# Patient Record
Sex: MALE | Race: BLACK OR AFRICAN AMERICAN | NOT HISPANIC OR LATINO | Employment: OTHER | URBAN - METROPOLITAN AREA
[De-identification: names, ages, dates, MRNs, and addresses within clinical notes are randomized per-mention and may not be internally consistent; named-entity substitution may affect disease eponyms.]

---

## 2019-05-15 ENCOUNTER — HOSPITAL ENCOUNTER (OUTPATIENT)
Dept: RADIOLOGY | Facility: HOSPITAL | Age: 44
Discharge: HOME | End: 2019-05-15
Attending: NURSE PRACTITIONER
Payer: MEDICARE

## 2019-05-15 ENCOUNTER — HOSPITAL ENCOUNTER (OUTPATIENT)
Dept: CARDIOLOGY | Facility: CLINIC | Age: 44
Discharge: HOME | End: 2019-05-15
Payer: MEDICARE

## 2019-05-15 DIAGNOSIS — R06.00 DYSPNEA: Primary | ICD-10-CM

## 2019-05-15 DIAGNOSIS — M79.662 BILATERAL CALF PAIN: ICD-10-CM

## 2019-05-15 DIAGNOSIS — R60.9 EDEMA: ICD-10-CM

## 2019-05-15 DIAGNOSIS — R06.2 WHEEZING: ICD-10-CM

## 2019-05-15 DIAGNOSIS — M79.661 BILATERAL CALF PAIN: ICD-10-CM

## 2019-05-15 DIAGNOSIS — R60.9 EDEMA: Primary | ICD-10-CM

## 2019-05-15 DIAGNOSIS — R06.00 DYSPNEA: ICD-10-CM

## 2019-05-15 PROCEDURE — 93010 EKG 12-LEAD: ICD-10-PCS | Mod: S$PBB,,, | Performed by: INTERNAL MEDICINE

## 2019-05-15 PROCEDURE — 71046 X-RAY EXAM CHEST 2 VIEWS: CPT | Mod: TC,PO

## 2019-05-15 PROCEDURE — 71046 XR CHEST PA AND LATERAL: ICD-10-PCS | Mod: 26,,, | Performed by: RADIOLOGY

## 2019-05-15 PROCEDURE — 93970 EXTREMITY STUDY: CPT | Mod: 26,,, | Performed by: RADIOLOGY

## 2019-05-15 PROCEDURE — 93010 ELECTROCARDIOGRAM REPORT: CPT | Mod: S$PBB,,, | Performed by: INTERNAL MEDICINE

## 2019-05-15 PROCEDURE — 93970 US LOWER EXTREMITY VEINS BILATERAL: ICD-10-PCS | Mod: 26,,, | Performed by: RADIOLOGY

## 2019-05-15 PROCEDURE — 71046 X-RAY EXAM CHEST 2 VIEWS: CPT | Mod: 26,,, | Performed by: RADIOLOGY

## 2019-05-15 PROCEDURE — 93970 EXTREMITY STUDY: CPT | Mod: TC,PO

## 2019-05-15 PROCEDURE — 93005 ELECTROCARDIOGRAM TRACING: CPT | Mod: PBBFAC,PO | Performed by: INTERNAL MEDICINE

## 2019-05-20 ENCOUNTER — HOSPITAL ENCOUNTER (EMERGENCY)
Facility: HOSPITAL | Age: 44
Discharge: HOME | End: 2019-05-20
Attending: EMERGENCY MEDICINE
Payer: MEDICARE

## 2019-05-20 VITALS
OXYGEN SATURATION: 97 % | SYSTOLIC BLOOD PRESSURE: 132 MMHG | DIASTOLIC BLOOD PRESSURE: 75 MMHG | TEMPERATURE: 99 F | WEIGHT: 264 LBS | HEART RATE: 89 BPM | RESPIRATION RATE: 18 BRPM

## 2019-05-20 DIAGNOSIS — M79.89 LEG SWELLING: ICD-10-CM

## 2019-05-20 DIAGNOSIS — R79.89 ELEVATED D-DIMER: Primary | ICD-10-CM

## 2019-05-20 LAB
ALBUMIN SERPL BCP-MCNC: 4.1 G/DL (ref 3.5–5.2)
ALP SERPL-CCNC: 67 U/L (ref 55–135)
ALT SERPL W/O P-5'-P-CCNC: 29 U/L (ref 10–44)
ANION GAP SERPL CALC-SCNC: 11 MMOL/L (ref 8–16)
ANISOCYTOSIS BLD QL SMEAR: SLIGHT
AST SERPL-CCNC: 24 U/L (ref 10–40)
BASOPHILS # BLD AUTO: 0.01 K/UL (ref 0–0.2)
BASOPHILS NFR BLD: 0.2 % (ref 0–1.9)
BILIRUB SERPL-MCNC: 0.3 MG/DL (ref 0.1–1)
BILIRUB UR QL STRIP: NEGATIVE
BNP SERPL-MCNC: <10 PG/ML (ref 0–99)
BUN SERPL-MCNC: 22 MG/DL (ref 6–20)
CALCIUM SERPL-MCNC: 10.3 MG/DL (ref 8.7–10.5)
CHLORIDE SERPL-SCNC: 102 MMOL/L (ref 95–110)
CK SERPL-CCNC: 378 U/L (ref 20–200)
CLARITY UR REFRACT.AUTO: CLEAR
CO2 SERPL-SCNC: 24 MMOL/L (ref 23–29)
COLOR UR AUTO: YELLOW
CREAT SERPL-MCNC: 1.3 MG/DL (ref 0.5–1.4)
DIFFERENTIAL METHOD: ABNORMAL
EOSINOPHIL # BLD AUTO: 0.1 K/UL (ref 0–0.5)
EOSINOPHIL NFR BLD: 2.2 % (ref 0–8)
ERYTHROCYTE [DISTWIDTH] IN BLOOD BY AUTOMATED COUNT: 14.1 % (ref 11.5–14.5)
EST. GFR  (AFRICAN AMERICAN): >60 ML/MIN/1.73 M^2
EST. GFR  (NON AFRICAN AMERICAN): >60 ML/MIN/1.73 M^2
GLUCOSE SERPL-MCNC: 115 MG/DL (ref 70–110)
GLUCOSE UR QL STRIP: NEGATIVE
HCT VFR BLD AUTO: 34.8 % (ref 40–54)
HGB BLD-MCNC: 11 G/DL (ref 14–18)
HGB UR QL STRIP: NEGATIVE
KETONES UR QL STRIP: NEGATIVE
LEUKOCYTE ESTERASE UR QL STRIP: NEGATIVE
LYMPHOCYTES # BLD AUTO: 0.9 K/UL (ref 1–4.8)
LYMPHOCYTES NFR BLD: 22.2 % (ref 18–48)
MCH RBC QN AUTO: 23.5 PG (ref 27–31)
MCHC RBC AUTO-ENTMCNC: 31.6 G/DL (ref 32–36)
MCV RBC AUTO: 74 FL (ref 82–98)
MONOCYTES # BLD AUTO: 0.4 K/UL (ref 0.3–1)
MONOCYTES NFR BLD: 8.6 % (ref 4–15)
NEUTROPHILS # BLD AUTO: 2.7 K/UL (ref 1.8–7.7)
NEUTROPHILS NFR BLD: 66.8 % (ref 38–73)
NITRITE UR QL STRIP: NEGATIVE
OVALOCYTES BLD QL SMEAR: ABNORMAL
PH UR STRIP: 6 [PH] (ref 5–8)
PLATELET # BLD AUTO: 242 K/UL (ref 150–350)
PMV BLD AUTO: 11.1 FL (ref 9.2–12.9)
POLYCHROMASIA BLD QL SMEAR: ABNORMAL
POTASSIUM SERPL-SCNC: 4.5 MMOL/L (ref 3.5–5.1)
PROT SERPL-MCNC: 8.5 G/DL (ref 6–8.4)
PROT UR QL STRIP: NEGATIVE
RBC # BLD AUTO: 4.68 M/UL (ref 4.6–6.2)
SODIUM SERPL-SCNC: 137 MMOL/L (ref 136–145)
SP GR UR STRIP: 1.01 (ref 1–1.03)
TROPONIN I SERPL DL<=0.01 NG/ML-MCNC: 0.02 NG/ML (ref 0–0.03)
URN SPEC COLLECT METH UR: NORMAL
UROBILINOGEN UR STRIP-ACNC: NEGATIVE EU/DL
WBC # BLD AUTO: 4.09 K/UL (ref 3.9–12.7)

## 2019-05-20 PROCEDURE — 99900035 HC TECH TIME PER 15 MIN (STAT): Mod: ER

## 2019-05-20 PROCEDURE — 81003 URINALYSIS AUTO W/O SCOPE: CPT | Mod: ER

## 2019-05-20 PROCEDURE — 85025 COMPLETE CBC W/AUTO DIFF WBC: CPT | Mod: ER

## 2019-05-20 PROCEDURE — 84484 ASSAY OF TROPONIN QUANT: CPT | Mod: ER

## 2019-05-20 PROCEDURE — 93010 ELECTROCARDIOGRAM REPORT: CPT | Mod: ,,, | Performed by: NUCLEAR MEDICINE

## 2019-05-20 PROCEDURE — 99285 EMERGENCY DEPT VISIT HI MDM: CPT | Mod: ER

## 2019-05-20 PROCEDURE — 25500020 PHARM REV CODE 255: Mod: ER | Performed by: EMERGENCY MEDICINE

## 2019-05-20 PROCEDURE — 82550 ASSAY OF CK (CPK): CPT | Mod: ER

## 2019-05-20 PROCEDURE — 93010 EKG 12-LEAD: ICD-10-PCS | Mod: ,,, | Performed by: NUCLEAR MEDICINE

## 2019-05-20 PROCEDURE — 93005 ELECTROCARDIOGRAM TRACING: CPT | Mod: ER

## 2019-05-20 PROCEDURE — 83880 ASSAY OF NATRIURETIC PEPTIDE: CPT | Mod: ER

## 2019-05-20 PROCEDURE — 80053 COMPREHEN METABOLIC PANEL: CPT | Mod: ER

## 2019-05-20 RX ORDER — AMLODIPINE AND BENAZEPRIL HYDROCHLORIDE 10; 40 MG/1; MG/1
1 CAPSULE ORAL DAILY
Refills: 1 | COMMUNITY
Start: 2019-04-23

## 2019-05-20 RX ORDER — MELOXICAM 7.5 MG/1
TABLET ORAL
Refills: 0 | COMMUNITY
Start: 2019-04-22

## 2019-05-20 RX ORDER — FUROSEMIDE 40 MG/1
TABLET ORAL
Refills: 0 | COMMUNITY
Start: 2019-05-14

## 2019-05-20 RX ORDER — POTASSIUM CHLORIDE 750 MG/1
TABLET, EXTENDED RELEASE ORAL
Refills: 0 | COMMUNITY
Start: 2019-05-14

## 2019-05-20 RX ORDER — HYDROCHLOROTHIAZIDE 25 MG/1
25 TABLET ORAL DAILY
Refills: 1 | COMMUNITY
Start: 2019-03-04

## 2019-05-20 RX ADMIN — IOHEXOL 100 ML: 350 INJECTION, SOLUTION INTRAVENOUS at 11:05

## 2019-05-20 NOTE — ED PROVIDER NOTES
"Encounter Date: 5/20/2019       History     Chief Complaint   Patient presents with    Abnormal Lab     sent over for elevated d-dimer. pt has no complaints     Patient with a history of TBI presents after seeing his PCP and having an elevated D-Dimer.  Denies chest pain or sob.  Only complaint is leg swelling for which his PCP did an ultrasound which was negative and and give a prescription for a diuretic.    The history is provided by the patient.   Illness    The current episode started several days ago. The problem occurs rarely. The problem has been unchanged. Nothing relieves the symptoms. Nothing aggravates the symptoms. Pertinent negatives include no diarrhea, no nausea, no vomiting, no cough and no shortness of breath. Services received include tests performed and medications given. Recently, medical care has been given by the PCP.     Review of patient's allergies indicates:  No Known Allergies  Past Medical History:   Diagnosis Date    Hypertension     Traumatic brain injury      Past Surgical History:   Procedure Laterality Date    ABDOMINAL SURGERY       History reviewed. No pertinent family history.  Social History     Tobacco Use    Smoking status: Never Smoker    Smokeless tobacco: Never Used   Substance Use Topics    Alcohol use: Yes     Comment: "2 or 3 beers" daily     Drug use: Yes     Types: Marijuana     Comment: occassionally      Review of Systems   Respiratory: Negative for cough and shortness of breath.    Gastrointestinal: Negative for diarrhea, nausea and vomiting.       Physical Exam     Initial Vitals [05/20/19 0943]   BP Pulse Resp Temp SpO2   137/62 99 18 98.1 °F (36.7 °C) 97 %      MAP       --         Physical Exam    Nursing note and vitals reviewed.  Constitutional: He appears well-developed and well-nourished. No distress.   HENT:   Head: Normocephalic and atraumatic.   Mouth/Throat: Oropharynx is clear and moist.   Eyes: Conjunctivae and EOM are normal. Pupils are equal, " round, and reactive to light.   Neck: Normal range of motion. Neck supple.   Cardiovascular: Normal rate, regular rhythm and normal heart sounds. Exam reveals no gallop and no friction rub.    No murmur heard.  Pulmonary/Chest: Breath sounds normal. No respiratory distress. He has no wheezes. He has no rhonchi. He has no rales.   Abdominal: Soft. Bowel sounds are normal. He exhibits no distension and no mass. There is no tenderness. There is no rebound and no guarding.   Musculoskeletal: Normal range of motion. He exhibits no edema.   Neurological: He is alert and oriented to person, place, and time. He has normal strength.   Skin: Skin is warm and dry. No rash noted.   Psychiatric: He has a normal mood and affect. Thought content normal.         ED Course   Procedures  Labs Reviewed   CBC W/ AUTO DIFFERENTIAL - Abnormal; Notable for the following components:       Result Value    Hemoglobin 11.0 (*)     Hematocrit 34.8 (*)     Mean Corpuscular Volume 74 (*)     Mean Corpuscular Hemoglobin 23.5 (*)     Mean Corpuscular Hemoglobin Conc 31.6 (*)     Lymph # 0.9 (*)     All other components within normal limits   COMPREHENSIVE METABOLIC PANEL - Abnormal; Notable for the following components:    Glucose 115 (*)     BUN, Bld 22 (*)     Total Protein 8.5 (*)     All other components within normal limits   CK - Abnormal; Notable for the following components:     (*)     All other components within normal limits   URINALYSIS, REFLEX TO URINE CULTURE    Narrative:     Preferred Collection Type->Urine, Clean Catch   B-TYPE NATRIURETIC PEPTIDE   TROPONIN I     EKG Readings: (Independently Interpreted)   Rhythm: Normal Sinus Rhythm. Heart Rate: 92. Ectopy: No Ectopy. Conduction: Normal. ST Segments: Normal ST Segments. T Waves: Normal. Clinical Impression: Normal Sinus Rhythm       Imaging Results          CTA Chest Non-Coronary (Final result)  Result time 05/20/19 11:30:22    Final result by Anjum Wilcox MD (05/20/19  11:30:22)                 Impression:      No evidence of pulmonary embolism.  No acute aortic disease.  Lungs are clear.  Airways patent.      Electronically signed by: Anjum Brenden  Date:    05/20/2019  Time:    11:30             Narrative:    EXAMINATION:  CTA CHEST NON CORONARY    CLINICAL HISTORY:  Chest pain, acute, PE suspected, intermed prob, positive D-dimer;Shortness of breath (786.05);    TECHNIQUE:  Low dose axial images, sagittal and coronal reformations were obtained from the thoracic inlet to the lung bases following the IV administration of 100 mL of Omnipaque 350.  All CT scans at this location are performed using dose modulation techniques as appropriate to a performed exam including the following: Automated exposure control; adjustment of the mA and/or kV  according to patient size.    COMPARISON:  Chest radiograph 05/20/2019    FINDINGS:  Base of Neck: No significant abnormality.    Thoracic soft tissues: Unremarkable.    Aorta: Left-sided aortic arch.  No aneurysm and no significant atherosclerosis    Heart: Normal size. No effusion.    Pulmonary vasculature: Pulmonary arteries distribute normally.  There are four pulmonary veins.    [place]/Mediastinum: No pathologic zackery enlargement.    Airways: Patent.  Mild cylindrical bronchiectasis.    Lungs/Pleura: Clear lungs. No pleural effusion or thickening.    Esophagus: Unremarkable.    Upper Abdomen: No acute abnormality of the partially imaged upper abdomen.  Suggestion of gastric suture material noted.    Bones: No acute fracture. No suspicious lytic or sclerotic lesions.                               X-Ray Chest AP Portable (Final result)  Result time 05/20/19 10:47:11    Final result by ANNE Jo Sr., MD (05/20/19 10:47:11)                 Impression:      1. The lungs are clear.  2. There is a mild amount of levoconvex curvature of the thoracolumbar spine.  .      Electronically signed by: Sky Jo  MD  Date:    05/20/2019  Time:    10:47             Narrative:    EXAMINATION:  XR CHEST AP PORTABLE    CLINICAL HISTORY:  leg swelling;    COMPARISON:  05/15/2019    FINDINGS:  The size of the heart is normal. The lungs are clear. There is no pneumothorax.  The costophrenic angles are sharp.  There is a mild amount of levoconvex curvature of the thoracolumbar spine.                                      11:36 AM: Reassessed pt at this time.  Pt states his condition has improved at this time. Discussed with pt all pertinent ED information and results. Discussed pt dx of leg swelling and plan of tx. Gave pt all f/u and return to the ED instructions. All questions and concerns were addressed at this time. Pt expresses understanding of information and instructions, and is comfortable with plan to discharge. Pt is stable for discharge.                     Clinical Impression:       ICD-10-CM ICD-9-CM   1. Elevated d-dimer R79.89 790.92   2. Leg swelling M79.89 729.81           Disposition:   Disposition: Discharged  Condition: Stable                        Carroll Peguero MD  05/20/19 1136       Carroll Peguero MD  05/29/19 1548

## 2019-05-20 NOTE — ED NOTES
Dr. Peguero is at the bedside updating pt on test results and poc. Pt has verbalized understanding, and he denies having any further questions or concerns at this time.

## 2021-06-26 ENCOUNTER — HOSPITAL ENCOUNTER (EMERGENCY)
Facility: HOSPITAL | Age: 46
Discharge: HOME | End: 2021-06-26
Attending: EMERGENCY MEDICINE
Payer: MEDICARE

## 2021-06-26 VITALS
BODY MASS INDEX: 50.15 KG/M2 | TEMPERATURE: 98 F | OXYGEN SATURATION: 96 % | HEART RATE: 98 BPM | RESPIRATION RATE: 20 BRPM | HEIGHT: 65 IN | WEIGHT: 301 LBS | DIASTOLIC BLOOD PRESSURE: 88 MMHG | SYSTOLIC BLOOD PRESSURE: 126 MMHG

## 2021-06-26 DIAGNOSIS — I89.0 LYMPHEDEMA OF LEFT LOWER EXTREMITY: Primary | ICD-10-CM

## 2021-06-26 DIAGNOSIS — R60.0 PERIPHERAL EDEMA: ICD-10-CM

## 2021-06-26 DIAGNOSIS — R60.0 BILATERAL LOWER EXTREMITY EDEMA: ICD-10-CM

## 2021-06-26 DIAGNOSIS — R50.9 FEVER: ICD-10-CM

## 2021-06-26 LAB
ALBUMIN SERPL BCP-MCNC: 3.8 G/DL (ref 3.5–5.2)
ALP SERPL-CCNC: 54 U/L (ref 55–135)
ALT SERPL W/O P-5'-P-CCNC: 27 U/L (ref 10–44)
ANION GAP SERPL CALC-SCNC: 13 MMOL/L (ref 8–16)
AST SERPL-CCNC: 19 U/L (ref 10–40)
BASOPHILS # BLD AUTO: 0.01 K/UL (ref 0–0.2)
BASOPHILS NFR BLD: 0.2 % (ref 0–1.9)
BILIRUB SERPL-MCNC: 0.5 MG/DL (ref 0.1–1)
BILIRUB UR QL STRIP: NEGATIVE
BUN SERPL-MCNC: 11 MG/DL (ref 6–20)
CALCIUM SERPL-MCNC: 9.4 MG/DL (ref 8.7–10.5)
CHLORIDE SERPL-SCNC: 103 MMOL/L (ref 95–110)
CLARITY UR REFRACT.AUTO: CLEAR
CO2 SERPL-SCNC: 24 MMOL/L (ref 23–29)
COLOR UR AUTO: YELLOW
CREAT SERPL-MCNC: 1.1 MG/DL (ref 0.5–1.4)
CTP QC/QA: YES
DIFFERENTIAL METHOD: ABNORMAL
EOSINOPHIL # BLD AUTO: 0.3 K/UL (ref 0–0.5)
EOSINOPHIL NFR BLD: 4.5 % (ref 0–8)
ERYTHROCYTE [DISTWIDTH] IN BLOOD BY AUTOMATED COUNT: 14.6 % (ref 11.5–14.5)
EST. GFR  (AFRICAN AMERICAN): >60 ML/MIN/1.73 M^2
EST. GFR  (NON AFRICAN AMERICAN): >60 ML/MIN/1.73 M^2
GLUCOSE SERPL-MCNC: 98 MG/DL (ref 70–110)
GLUCOSE UR QL STRIP: NEGATIVE
HCT VFR BLD AUTO: 32.3 % (ref 40–54)
HGB BLD-MCNC: 10.2 G/DL (ref 14–18)
HGB UR QL STRIP: NEGATIVE
IMM GRANULOCYTES # BLD AUTO: 0.02 K/UL (ref 0–0.04)
IMM GRANULOCYTES NFR BLD AUTO: 0.3 % (ref 0–0.5)
KETONES UR QL STRIP: NEGATIVE
LACTATE SERPL-SCNC: 1.1 MMOL/L (ref 0.5–2.2)
LEUKOCYTE ESTERASE UR QL STRIP: NEGATIVE
LYMPHOCYTES # BLD AUTO: 1 K/UL (ref 1–4.8)
LYMPHOCYTES NFR BLD: 15.1 % (ref 18–48)
MCH RBC QN AUTO: 23.6 PG (ref 27–31)
MCHC RBC AUTO-ENTMCNC: 31.6 G/DL (ref 32–36)
MCV RBC AUTO: 75 FL (ref 82–98)
MONOCYTES # BLD AUTO: 0.6 K/UL (ref 0.3–1)
MONOCYTES NFR BLD: 8.6 % (ref 4–15)
NEUTROPHILS # BLD AUTO: 4.7 K/UL (ref 1.8–7.7)
NEUTROPHILS NFR BLD: 71.3 % (ref 38–73)
NITRITE UR QL STRIP: NEGATIVE
NRBC BLD-RTO: 0 /100 WBC
PH UR STRIP: 6 [PH] (ref 5–8)
PLATELET # BLD AUTO: 237 K/UL (ref 150–450)
PMV BLD AUTO: 10.1 FL (ref 9.2–12.9)
POTASSIUM SERPL-SCNC: 3.9 MMOL/L (ref 3.5–5.1)
PROT SERPL-MCNC: 8.4 G/DL (ref 6–8.4)
PROT UR QL STRIP: NEGATIVE
RBC # BLD AUTO: 4.32 M/UL (ref 4.6–6.2)
SARS-COV-2 RDRP RESP QL NAA+PROBE: NEGATIVE
SODIUM SERPL-SCNC: 140 MMOL/L (ref 136–145)
SP GR UR STRIP: 1.02 (ref 1–1.03)
URN SPEC COLLECT METH UR: NORMAL
UROBILINOGEN UR STRIP-ACNC: NEGATIVE EU/DL
WBC # BLD AUTO: 6.62 K/UL (ref 3.9–12.7)

## 2021-06-26 PROCEDURE — 83605 ASSAY OF LACTIC ACID: CPT | Mod: ER | Performed by: EMERGENCY MEDICINE

## 2021-06-26 PROCEDURE — 80053 COMPREHEN METABOLIC PANEL: CPT | Mod: ER | Performed by: EMERGENCY MEDICINE

## 2021-06-26 PROCEDURE — 99284 EMERGENCY DEPT VISIT MOD MDM: CPT | Mod: 25,ER

## 2021-06-26 PROCEDURE — 81003 URINALYSIS AUTO W/O SCOPE: CPT | Mod: ER | Performed by: EMERGENCY MEDICINE

## 2021-06-26 PROCEDURE — 85025 COMPLETE CBC W/AUTO DIFF WBC: CPT | Mod: ER | Performed by: EMERGENCY MEDICINE

## 2021-06-26 PROCEDURE — U0002 COVID-19 LAB TEST NON-CDC: HCPCS | Mod: ER | Performed by: EMERGENCY MEDICINE

## 2023-01-23 ENCOUNTER — HOSPITAL ENCOUNTER (EMERGENCY)
Facility: HOSPITAL | Age: 48
Discharge: HOME | End: 2023-01-23
Attending: EMERGENCY MEDICINE
Payer: COMMERCIAL

## 2023-01-23 VITALS
DIASTOLIC BLOOD PRESSURE: 71 MMHG | HEART RATE: 97 BPM | SYSTOLIC BLOOD PRESSURE: 146 MMHG | OXYGEN SATURATION: 97 % | TEMPERATURE: 99 F | BODY MASS INDEX: 52.74 KG/M2 | WEIGHT: 315 LBS | RESPIRATION RATE: 20 BRPM

## 2023-01-23 DIAGNOSIS — I89.0 LYMPHEDEMA: Primary | ICD-10-CM

## 2023-01-23 DIAGNOSIS — T14.8XXA WOUND INFECTION: ICD-10-CM

## 2023-01-23 DIAGNOSIS — L08.9 WOUND INFECTION: ICD-10-CM

## 2023-01-23 LAB
ALBUMIN SERPL BCP-MCNC: 4.1 G/DL (ref 3.5–5.2)
ALP SERPL-CCNC: 67 U/L (ref 55–135)
ALT SERPL W/O P-5'-P-CCNC: 27 U/L (ref 10–44)
ANION GAP SERPL CALC-SCNC: 11 MMOL/L (ref 8–16)
AST SERPL-CCNC: 17 U/L (ref 10–40)
BASOPHILS # BLD AUTO: 0.02 K/UL (ref 0–0.2)
BASOPHILS NFR BLD: 0.4 % (ref 0–1.9)
BILIRUB SERPL-MCNC: 0.4 MG/DL (ref 0.1–1)
BUN SERPL-MCNC: 16 MG/DL (ref 6–20)
CALCIUM SERPL-MCNC: 9.7 MG/DL (ref 8.7–10.5)
CHLORIDE SERPL-SCNC: 100 MMOL/L (ref 95–110)
CO2 SERPL-SCNC: 27 MMOL/L (ref 23–29)
CREAT SERPL-MCNC: 1.2 MG/DL (ref 0.5–1.4)
DIFFERENTIAL METHOD: ABNORMAL
EOSINOPHIL # BLD AUTO: 0.1 K/UL (ref 0–0.5)
EOSINOPHIL NFR BLD: 2 % (ref 0–8)
ERYTHROCYTE [DISTWIDTH] IN BLOOD BY AUTOMATED COUNT: 13.8 % (ref 11.5–14.5)
EST. GFR  (NO RACE VARIABLE): >60 ML/MIN/1.73 M^2
GLUCOSE SERPL-MCNC: 122 MG/DL (ref 70–110)
HCT VFR BLD AUTO: 39 % (ref 40–54)
HCV AB SERPL QL IA: NEGATIVE
HEP C VIRUS HOLD SPECIMEN: NORMAL
HGB BLD-MCNC: 12 G/DL (ref 14–18)
HIV 1+2 AB+HIV1 P24 AG SERPL QL IA: NEGATIVE
IMM GRANULOCYTES # BLD AUTO: 0.02 K/UL (ref 0–0.04)
IMM GRANULOCYTES NFR BLD AUTO: 0.4 % (ref 0–0.5)
LACTATE SERPL-SCNC: 1.7 MMOL/L (ref 0.5–2.2)
LYMPHOCYTES # BLD AUTO: 1 K/UL (ref 1–4.8)
LYMPHOCYTES NFR BLD: 17.4 % (ref 18–48)
MCH RBC QN AUTO: 23.3 PG (ref 27–31)
MCHC RBC AUTO-ENTMCNC: 30.8 G/DL (ref 32–36)
MCV RBC AUTO: 76 FL (ref 82–98)
MONOCYTES # BLD AUTO: 0.5 K/UL (ref 0.3–1)
MONOCYTES NFR BLD: 8.3 % (ref 4–15)
NEUTROPHILS # BLD AUTO: 3.9 K/UL (ref 1.8–7.7)
NEUTROPHILS NFR BLD: 71.5 % (ref 38–73)
NRBC BLD-RTO: 0 /100 WBC
PLATELET # BLD AUTO: 257 K/UL (ref 150–450)
PMV BLD AUTO: 11.1 FL (ref 9.2–12.9)
POTASSIUM SERPL-SCNC: 4 MMOL/L (ref 3.5–5.1)
PROT SERPL-MCNC: 8.5 G/DL (ref 6–8.4)
RBC # BLD AUTO: 5.14 M/UL (ref 4.6–6.2)
SODIUM SERPL-SCNC: 138 MMOL/L (ref 136–145)
WBC # BLD AUTO: 5.51 K/UL (ref 3.9–12.7)

## 2023-01-23 PROCEDURE — 85025 COMPLETE CBC W/AUTO DIFF WBC: CPT | Mod: ER | Performed by: EMERGENCY MEDICINE

## 2023-01-23 PROCEDURE — 87040 BLOOD CULTURE FOR BACTERIA: CPT | Mod: 59 | Performed by: EMERGENCY MEDICINE

## 2023-01-23 PROCEDURE — 99284 EMERGENCY DEPT VISIT MOD MDM: CPT | Mod: 25,ER

## 2023-01-23 PROCEDURE — 86803 HEPATITIS C AB TEST: CPT | Performed by: EMERGENCY MEDICINE

## 2023-01-23 PROCEDURE — 80053 COMPREHEN METABOLIC PANEL: CPT | Mod: ER | Performed by: EMERGENCY MEDICINE

## 2023-01-23 PROCEDURE — 83605 ASSAY OF LACTIC ACID: CPT | Mod: ER | Performed by: EMERGENCY MEDICINE

## 2023-01-23 PROCEDURE — 25000003 PHARM REV CODE 250: Mod: ER | Performed by: EMERGENCY MEDICINE

## 2023-01-23 PROCEDURE — 87389 HIV-1 AG W/HIV-1&-2 AB AG IA: CPT | Performed by: EMERGENCY MEDICINE

## 2023-01-23 RX ORDER — CEPHALEXIN 500 MG/1
500 CAPSULE ORAL 4 TIMES DAILY
Qty: 28 CAPSULE | Refills: 0 | Status: SHIPPED | OUTPATIENT
Start: 2023-01-23 | End: 2023-01-30

## 2023-01-23 RX ORDER — SULFAMETHOXAZOLE AND TRIMETHOPRIM 800; 160 MG/1; MG/1
1 TABLET ORAL 2 TIMES DAILY
Qty: 14 TABLET | Refills: 0 | Status: SHIPPED | OUTPATIENT
Start: 2023-01-23 | End: 2023-01-30

## 2023-01-23 RX ORDER — BUMETANIDE 1 MG/1
1 TABLET ORAL
COMMUNITY
Start: 2023-01-09

## 2023-01-23 RX ORDER — SULFAMETHOXAZOLE AND TRIMETHOPRIM 800; 160 MG/1; MG/1
1 TABLET ORAL
Status: COMPLETED | OUTPATIENT
Start: 2023-01-23 | End: 2023-01-23

## 2023-01-23 RX ORDER — CEPHALEXIN 500 MG/1
500 CAPSULE ORAL
Status: COMPLETED | OUTPATIENT
Start: 2023-01-23 | End: 2023-01-23

## 2023-01-23 RX ADMIN — SULFAMETHOXAZOLE AND TRIMETHOPRIM 1 TABLET: 800; 160 TABLET ORAL at 03:01

## 2023-01-23 RX ADMIN — CEPHALEXIN 500 MG: 500 CAPSULE ORAL at 03:01

## 2023-01-23 NOTE — ED PROVIDER NOTES
"Encounter Date: 1/23/2023       History     Chief Complaint   Patient presents with    Skin Ulcer     Sent from Dr Zimmerman's office for LLE ulcer     Patient is a 47-year-old male with a past medical history of lymphedema who presents to the emergency department at the direction of his primary care physician's office for evaluation of a left lower extremity wound.  Patient has a diagnosis of a chronic left lower extremity wound, but patient reports some recent foul smelling and some enlargement in his left groin region.  He denies any fever/chills.  States he was being treated by home health and wound care, but they have stopped coming.  He reports that he needs another referral.  Denies chest pain, shortness of breath and all other complaints.    Review of patient's allergies indicates:   Allergen Reactions    Dilantin infatabs [phenytoin]      Past Medical History:   Diagnosis Date    Hypertension     Traumatic brain injury      Past Surgical History:   Procedure Laterality Date    ABDOMINAL SURGERY       History reviewed. No pertinent family history.  Social History     Tobacco Use    Smoking status: Never    Smokeless tobacco: Never   Substance Use Topics    Alcohol use: Yes     Comment: "2 or 3 beers" daily     Drug use: Yes     Types: Marijuana     Comment: occassionally      Review of Systems   All other systems reviewed and are negative.  See HPI.  Otherwise negative    Physical Exam     Initial Vitals   BP Pulse Resp Temp SpO2   01/23/23 1113 01/23/23 1114 01/23/23 1110 01/23/23 1110 01/23/23 1110   (!) 153/68 110 20 98.7 °F (37.1 °C) 98 %      MAP       --                Physical Exam    Nursing note and vitals reviewed.  Constitutional: No distress.   Elevated BMI   HENT:   Head: Normocephalic and atraumatic.   Mouth/Throat: Oropharynx is clear and moist.   Eyes: Conjunctivae and EOM are normal. Pupils are equal, round, and reactive to light.   Neck: Neck supple. No tracheal deviation present. "   Cardiovascular:  Normal rate, regular rhythm, normal heart sounds and intact distal pulses.           1+ DP pulse to left lower extremity.  Likely difficult to palpate due to the excessive edema.  Easily located with Doppler   Pulmonary/Chest: Breath sounds normal. No respiratory distress.   Abdominal: Abdomen is soft. He exhibits no distension. There is no abdominal tenderness. There is no rebound and no guarding.   Musculoskeletal:         General: Edema (Lymphedema to the bilateral lower extremities) present. No tenderness. Normal range of motion.      Cervical back: Neck supple.      Comments: Left inguinal lymphadenopathy     Neurological: He is alert and oriented to person, place, and time. GCS score is 15. GCS eye subscore is 4. GCS verbal subscore is 5. GCS motor subscore is 6.   No focal deficits   Skin: No erythema.   Foul smelling Quarter-sized wound to the left lower extremity.  No surrounding erythema.  No purulence.   Psychiatric: He has a normal mood and affect. His behavior is normal.       ED Course   Procedures  Labs Reviewed   CBC W/ AUTO DIFFERENTIAL - Abnormal; Notable for the following components:       Result Value    Hemoglobin 12.0 (*)     Hematocrit 39.0 (*)     MCV 76 (*)     MCH 23.3 (*)     MCHC 30.8 (*)     Lymph % 17.4 (*)     All other components within normal limits   COMPREHENSIVE METABOLIC PANEL - Abnormal; Notable for the following components:    Glucose 122 (*)     Total Protein 8.5 (*)     All other components within normal limits   CULTURE, BLOOD   CULTURE, BLOOD   LACTIC ACID, PLASMA   HIV 1 / 2 ANTIBODY   HEPATITIS C ANTIBODY   HEP C VIRUS HOLD SPECIMEN     Results for orders placed or performed during the hospital encounter of 01/23/23   CBC auto differential   Result Value Ref Range    WBC 5.51 3.90 - 12.70 K/uL    RBC 5.14 4.60 - 6.20 M/uL    Hemoglobin 12.0 (L) 14.0 - 18.0 g/dL    Hematocrit 39.0 (L) 40.0 - 54.0 %    MCV 76 (L) 82 - 98 fL    MCH 23.3 (L) 27.0 - 31.0 pg     MCHC 30.8 (L) 32.0 - 36.0 g/dL    RDW 13.8 11.5 - 14.5 %    Platelets 257 150 - 450 K/uL    MPV 11.1 9.2 - 12.9 fL    Immature Granulocytes 0.4 0.0 - 0.5 %    Gran # (ANC) 3.9 1.8 - 7.7 K/uL    Immature Grans (Abs) 0.02 0.00 - 0.04 K/uL    Lymph # 1.0 1.0 - 4.8 K/uL    Mono # 0.5 0.3 - 1.0 K/uL    Eos # 0.1 0.0 - 0.5 K/uL    Baso # 0.02 0.00 - 0.20 K/uL    nRBC 0 0 /100 WBC    Gran % 71.5 38.0 - 73.0 %    Lymph % 17.4 (L) 18.0 - 48.0 %    Mono % 8.3 4.0 - 15.0 %    Eosinophil % 2.0 0.0 - 8.0 %    Basophil % 0.4 0.0 - 1.9 %    Differential Method Automated    Comprehensive metabolic panel   Result Value Ref Range    Sodium 138 136 - 145 mmol/L    Potassium 4.0 3.5 - 5.1 mmol/L    Chloride 100 95 - 110 mmol/L    CO2 27 23 - 29 mmol/L    Glucose 122 (H) 70 - 110 mg/dL    BUN 16 6 - 20 mg/dL    Creatinine 1.2 0.5 - 1.4 mg/dL    Calcium 9.7 8.7 - 10.5 mg/dL    Total Protein 8.5 (H) 6.0 - 8.4 g/dL    Albumin 4.1 3.5 - 5.2 g/dL    Total Bilirubin 0.4 0.1 - 1.0 mg/dL    Alkaline Phosphatase 67 55 - 135 U/L    AST 17 10 - 40 U/L    ALT 27 10 - 44 U/L    Anion Gap 11 8 - 16 mmol/L    eGFR >60.0 >60 mL/min/1.73 m^2   Lactic acid, plasma   Result Value Ref Range    Lactate (Lactic Acid) 1.7 0.5 - 2.2 mmol/L            Imaging Results              US Soft Tissue, Groin Left (Final result)  Result time 01/23/23 12:53:08      Final result by ANNE Jo Sr., MD (01/23/23 12:53:08)                   Impression:      There are several enlarged lymph nodes in the left groin consistent with the patient's palpable abnormality.  If additional evaluation is clinically indicated, I recommend consideration of a biopsy/fine needle aspiration.      Electronically signed by: Sky Jo MD  Date:    01/23/2023  Time:    12:53               Narrative:    EXAMINATION:  US SOFT TISSUE, GROIN LEFT    CLINICAL HISTORY:  left groin swelling;    TECHNIQUE:  Multiple static ultrasound images are submitted for  interpretation.    COMPARISON:  None    FINDINGS:  There are several enlarged lymph nodes in the left groin consistent with the patient's palpable abnormality.  The larger 1 measures 7.1 cm x 2.1 cm by 4.6 cm.  The other lymph node measures 3.9 cm x 1.8 cm x 3.0 cm.                                       Medications   sulfamethoxazole-trimethoprim 800-160mg per tablet 1 tablet (has no administration in time range)   cephALEXin capsule 500 mg (has no administration in time range)     Medical Decision Making:   History:   Old Records Summarized: records from clinic visits.       <> Summary of Records: Notes reviewed from primary care physician office today.  Patient was sent to the emergency department to be evaluated for antibiotics, physical therapy, wound care, and vascular surgery according to the note  Initial Assessment:   Lymphedema with infected leg wound  Clinical Tests:   Lab Tests: Ordered and Reviewed  The following lab test(s) were unremarkable: CBC, CMP and Lactate       <> Summary of Lab: No leukocytosis  Radiological Study: Ordered and Reviewed  ED Management:  Chronic lymphedema with chronic left lower extremity wound with an acute infection of that wound.  Foul smelling with inguinal lymphadenopathy.  Will treat with antibiotics.  No indication at this time for hospitalization or requirements for IV antibiotics.  Will give a trial of p.o. antibiotics.  Discussed with patient's home health on the phone.  Home health to contact patient's primary care physician office today to arrange home health/wound care at home.  Patient agreeable to outpatient treatment.  ER return precautions provided                        Clinical Impression:   Final diagnoses:  [I89.0] Lymphedema (Primary)  [T14.8XXA, L08.9] Wound infection        ED Disposition Condition    Discharge Stable          ED Prescriptions       Medication Sig Dispense Start Date End Date Auth. Provider    sulfamethoxazole-trimethoprim 800-160mg  (BACTRIM DS) 800-160 mg Tab Take 1 tablet by mouth 2 (two) times daily. for 7 days 14 tablet 1/23/2023 1/30/2023 Kyaw Butts MD    cephALEXin (KEFLEX) 500 MG capsule Take 1 capsule (500 mg total) by mouth 4 (four) times daily. for 7 days 28 capsule 1/23/2023 1/30/2023 Kyaw Butts MD          Follow-up Information       Follow up With Specialties Details Why Contact Info    Follow-up with primary care physician and wound care/home health        [facility] - Emergency Dept Emergency Medicine  As needed, If symptoms worsen [phone]  [facility] [phone] [phone]             Kyaw Butts MD  01/23/23 9164

## 2023-01-29 LAB
BACTERIA BLD CULT: NORMAL
BACTERIA BLD CULT: NORMAL

## 2023-04-25 ENCOUNTER — TELEPHONE (OUTPATIENT)
Dept: CARDIOLOGY | Facility: CLINIC | Age: 48
End: 2023-04-25
Payer: COMMERCIAL

## 2024-08-23 ENCOUNTER — LAB VISIT (OUTPATIENT)
Dept: LAB | Facility: HOSPITAL | Age: 49
End: 2024-08-23
Payer: COMMERCIAL

## 2024-08-23 DIAGNOSIS — I10 ESSENTIAL HYPERTENSION, MALIGNANT: Primary | ICD-10-CM

## 2024-08-23 DIAGNOSIS — E11.9 DIABETES MELLITUS WITHOUT COMPLICATION: ICD-10-CM

## 2024-08-23 DIAGNOSIS — E66.01 MORBID OBESITY: ICD-10-CM

## 2024-08-23 LAB
ERYTHROCYTE [DISTWIDTH] IN BLOOD BY AUTOMATED COUNT: 14.7 % (ref 11.5–14.5)
HCT VFR BLD AUTO: 38.1 % (ref 40–54)
HGB BLD-MCNC: 11.7 G/DL (ref 14–18)
MCH RBC QN AUTO: 22.9 PG (ref 27–31)
MCHC RBC AUTO-ENTMCNC: 30.7 G/DL (ref 32–36)
MCV RBC AUTO: 74 FL (ref 82–98)
PLATELET # BLD AUTO: 225 K/UL (ref 150–450)
PMV BLD AUTO: 11.4 FL (ref 9.2–12.9)
RBC # BLD AUTO: 5.12 M/UL (ref 4.6–6.2)
WBC # BLD AUTO: 5.62 K/UL (ref 3.9–12.7)

## 2024-08-23 PROCEDURE — 83036 HEMOGLOBIN GLYCOSYLATED A1C: CPT

## 2024-08-23 PROCEDURE — 85027 COMPLETE CBC AUTOMATED: CPT | Mod: PO

## 2024-08-24 LAB
ESTIMATED AVG GLUCOSE: 166 MG/DL (ref 68–131)
HBA1C MFR BLD: 7.4 % (ref 4–5.6)

## 2024-08-30 ENCOUNTER — LAB VISIT (OUTPATIENT)
Dept: LAB | Facility: HOSPITAL | Age: 49
End: 2024-08-30
Payer: COMMERCIAL

## 2024-08-30 DIAGNOSIS — E11.9 DIABETES MELLITUS WITHOUT COMPLICATION: ICD-10-CM

## 2024-08-30 DIAGNOSIS — I10 ESSENTIAL HYPERTENSION, MALIGNANT: Primary | ICD-10-CM

## 2024-08-30 DIAGNOSIS — I10 ESSENTIAL HYPERTENSION, MALIGNANT: ICD-10-CM

## 2024-08-30 LAB
ALBUMIN SERPL BCP-MCNC: 4.1 G/DL (ref 3.5–5.2)
ALP SERPL-CCNC: 65 U/L (ref 55–135)
ALT SERPL W/O P-5'-P-CCNC: 19 U/L (ref 10–44)
ANION GAP SERPL CALC-SCNC: 13 MMOL/L (ref 8–16)
AST SERPL-CCNC: 16 U/L (ref 10–40)
BILIRUB SERPL-MCNC: 0.7 MG/DL (ref 0.1–1)
BUN SERPL-MCNC: 11 MG/DL (ref 6–20)
CALCIUM SERPL-MCNC: 9.8 MG/DL (ref 8.7–10.5)
CHLORIDE SERPL-SCNC: 98 MMOL/L (ref 95–110)
CO2 SERPL-SCNC: 27 MMOL/L (ref 23–29)
CREAT SERPL-MCNC: 1.2 MG/DL (ref 0.5–1.4)
CRP SERPL-MCNC: 14.9 MG/L (ref 0–8.2)
EST. GFR  (NO RACE VARIABLE): >60 ML/MIN/1.73 M^2
GLUCOSE SERPL-MCNC: 159 MG/DL (ref 70–110)
POTASSIUM SERPL-SCNC: 3.6 MMOL/L (ref 3.5–5.1)
PROT SERPL-MCNC: 8.3 G/DL (ref 6–8.4)
SODIUM SERPL-SCNC: 138 MMOL/L (ref 136–145)

## 2024-08-30 PROCEDURE — 80053 COMPREHEN METABOLIC PANEL: CPT | Mod: PO

## 2024-08-30 PROCEDURE — 36415 COLL VENOUS BLD VENIPUNCTURE: CPT | Mod: PO

## 2024-08-30 PROCEDURE — 86140 C-REACTIVE PROTEIN: CPT | Mod: PO

## 2024-08-30 PROCEDURE — 84134 ASSAY OF PREALBUMIN: CPT

## 2024-08-31 LAB — PREALB SERPL-MCNC: 26 MG/DL (ref 20–43)

## 2024-09-20 ENCOUNTER — LAB VISIT (OUTPATIENT)
Dept: LAB | Facility: HOSPITAL | Age: 49
End: 2024-09-20
Payer: COMMERCIAL

## 2024-09-20 DIAGNOSIS — E66.01 MORBID OBESITY: ICD-10-CM

## 2024-09-20 DIAGNOSIS — E11.9 DIABETES MELLITUS WITHOUT COMPLICATION: ICD-10-CM

## 2024-09-20 DIAGNOSIS — I10 ESSENTIAL HYPERTENSION, MALIGNANT: ICD-10-CM

## 2024-09-20 LAB
ALBUMIN SERPL BCP-MCNC: 3.7 G/DL (ref 3.5–5.2)
ALP SERPL-CCNC: 65 U/L (ref 55–135)
ALT SERPL W/O P-5'-P-CCNC: 24 U/L (ref 10–44)
ANION GAP SERPL CALC-SCNC: 10 MMOL/L (ref 8–16)
AST SERPL-CCNC: 16 U/L (ref 10–40)
BASOPHILS # BLD AUTO: 0.02 K/UL (ref 0–0.2)
BASOPHILS NFR BLD: 0.4 % (ref 0–1.9)
BILIRUB SERPL-MCNC: 0.5 MG/DL (ref 0.1–1)
BUN SERPL-MCNC: 12 MG/DL (ref 6–20)
CALCIUM SERPL-MCNC: 9.2 MG/DL (ref 8.7–10.5)
CHLORIDE SERPL-SCNC: 102 MMOL/L (ref 95–110)
CO2 SERPL-SCNC: 25 MMOL/L (ref 23–29)
CREAT SERPL-MCNC: 1.2 MG/DL (ref 0.5–1.4)
DIFFERENTIAL METHOD BLD: ABNORMAL
EOSINOPHIL # BLD AUTO: 0.1 K/UL (ref 0–0.5)
EOSINOPHIL NFR BLD: 3.1 % (ref 0–8)
ERYTHROCYTE [DISTWIDTH] IN BLOOD BY AUTOMATED COUNT: 14.9 % (ref 11.5–14.5)
EST. GFR  (NO RACE VARIABLE): >60 ML/MIN/1.73 M^2
GLUCOSE SERPL-MCNC: 216 MG/DL (ref 70–110)
HCT VFR BLD AUTO: 37 % (ref 40–54)
HGB BLD-MCNC: 11.3 G/DL (ref 14–18)
IMM GRANULOCYTES # BLD AUTO: 0.01 K/UL (ref 0–0.04)
IMM GRANULOCYTES NFR BLD AUTO: 0.2 % (ref 0–0.5)
LYMPHOCYTES # BLD AUTO: 1 K/UL (ref 1–4.8)
LYMPHOCYTES NFR BLD: 22.3 % (ref 18–48)
MCH RBC QN AUTO: 23.1 PG (ref 27–31)
MCHC RBC AUTO-ENTMCNC: 30.5 G/DL (ref 32–36)
MCV RBC AUTO: 76 FL (ref 82–98)
MONOCYTES # BLD AUTO: 0.4 K/UL (ref 0.3–1)
MONOCYTES NFR BLD: 7.7 % (ref 4–15)
NEUTROPHILS # BLD AUTO: 3 K/UL (ref 1.8–7.7)
NEUTROPHILS NFR BLD: 66.3 % (ref 38–73)
NRBC BLD-RTO: 0 /100 WBC
PLATELET # BLD AUTO: 215 K/UL (ref 150–450)
PMV BLD AUTO: 11 FL (ref 9.2–12.9)
POTASSIUM SERPL-SCNC: 4 MMOL/L (ref 3.5–5.1)
PROT SERPL-MCNC: 8.1 G/DL (ref 6–8.4)
RBC # BLD AUTO: 4.89 M/UL (ref 4.6–6.2)
SODIUM SERPL-SCNC: 137 MMOL/L (ref 136–145)
WBC # BLD AUTO: 4.52 K/UL (ref 3.9–12.7)

## 2024-09-20 PROCEDURE — 85652 RBC SED RATE AUTOMATED: CPT

## 2024-09-20 PROCEDURE — 85025 COMPLETE CBC W/AUTO DIFF WBC: CPT | Mod: PO

## 2024-09-20 PROCEDURE — 80053 COMPREHEN METABOLIC PANEL: CPT | Mod: PO

## 2024-09-20 PROCEDURE — 84134 ASSAY OF PREALBUMIN: CPT

## 2024-09-21 LAB
ERYTHROCYTE [SEDIMENTATION RATE] IN BLOOD BY PHOTOMETRIC METHOD: 93 MM/HR (ref 0–23)
PREALB SERPL-MCNC: 23 MG/DL (ref 20–43)

## 2024-10-18 ENCOUNTER — LAB VISIT (OUTPATIENT)
Dept: LAB | Facility: HOSPITAL | Age: 49
End: 2024-10-18
Payer: COMMERCIAL

## 2024-10-18 DIAGNOSIS — E11.9 DIABETES MELLITUS WITHOUT COMPLICATION: Primary | ICD-10-CM

## 2024-10-18 LAB
ALBUMIN SERPL BCP-MCNC: 4.3 G/DL (ref 3.5–5.2)
ALP SERPL-CCNC: 78 U/L (ref 40–150)
ALT SERPL W/O P-5'-P-CCNC: 38 U/L (ref 10–44)
ANION GAP SERPL CALC-SCNC: 16 MMOL/L (ref 8–16)
AST SERPL-CCNC: 23 U/L (ref 10–40)
BASOPHILS # BLD AUTO: 0.02 K/UL (ref 0–0.2)
BASOPHILS NFR BLD: 0.4 % (ref 0–1.9)
BILIRUB SERPL-MCNC: 0.5 MG/DL (ref 0.1–1)
BUN SERPL-MCNC: 12 MG/DL (ref 6–20)
CALCIUM SERPL-MCNC: 9.5 MG/DL (ref 8.7–10.5)
CHLORIDE SERPL-SCNC: 100 MMOL/L (ref 95–110)
CO2 SERPL-SCNC: 23 MMOL/L (ref 23–29)
CREAT SERPL-MCNC: 1 MG/DL (ref 0.5–1.4)
CRP SERPL-MCNC: 16.1 MG/L (ref 0–8.2)
DIFFERENTIAL METHOD BLD: ABNORMAL
EOSINOPHIL # BLD AUTO: 0.2 K/UL (ref 0–0.5)
EOSINOPHIL NFR BLD: 2.9 % (ref 0–8)
ERYTHROCYTE [DISTWIDTH] IN BLOOD BY AUTOMATED COUNT: 15.4 % (ref 11.5–14.5)
EST. GFR  (NO RACE VARIABLE): >60 ML/MIN/1.73 M^2
GLUCOSE SERPL-MCNC: 142 MG/DL (ref 70–110)
HCT VFR BLD AUTO: 41.8 % (ref 40–54)
HGB BLD-MCNC: 12.8 G/DL (ref 14–18)
IMM GRANULOCYTES # BLD AUTO: 0.02 K/UL (ref 0–0.04)
IMM GRANULOCYTES NFR BLD AUTO: 0.4 % (ref 0–0.5)
LYMPHOCYTES # BLD AUTO: 1.1 K/UL (ref 1–4.8)
LYMPHOCYTES NFR BLD: 20.6 % (ref 18–48)
MCH RBC QN AUTO: 23.1 PG (ref 27–31)
MCHC RBC AUTO-ENTMCNC: 30.6 G/DL (ref 32–36)
MCV RBC AUTO: 75 FL (ref 82–98)
MONOCYTES # BLD AUTO: 0.4 K/UL (ref 0.3–1)
MONOCYTES NFR BLD: 7.1 % (ref 4–15)
NEUTROPHILS # BLD AUTO: 3.5 K/UL (ref 1.8–7.7)
NEUTROPHILS NFR BLD: 68.6 % (ref 38–73)
NRBC BLD-RTO: 0 /100 WBC
PLATELET # BLD AUTO: 246 K/UL (ref 150–450)
PMV BLD AUTO: 11.1 FL (ref 9.2–12.9)
POTASSIUM SERPL-SCNC: 3.8 MMOL/L (ref 3.5–5.1)
PROT SERPL-MCNC: 8.9 G/DL (ref 6–8.4)
RBC # BLD AUTO: 5.55 M/UL (ref 4.6–6.2)
SODIUM SERPL-SCNC: 139 MMOL/L (ref 136–145)
WBC # BLD AUTO: 5.09 K/UL (ref 3.9–12.7)

## 2024-10-18 PROCEDURE — 85652 RBC SED RATE AUTOMATED: CPT

## 2024-10-18 PROCEDURE — 80053 COMPREHEN METABOLIC PANEL: CPT | Mod: PO

## 2024-10-18 PROCEDURE — 84134 ASSAY OF PREALBUMIN: CPT

## 2024-10-18 PROCEDURE — 86140 C-REACTIVE PROTEIN: CPT | Mod: PO

## 2024-10-18 PROCEDURE — 85025 COMPLETE CBC W/AUTO DIFF WBC: CPT | Mod: PO

## 2024-10-19 LAB
ERYTHROCYTE [SEDIMENTATION RATE] IN BLOOD BY PHOTOMETRIC METHOD: 108 MM/HR (ref 0–23)
PREALB SERPL-MCNC: 26 MG/DL (ref 20–43)

## 2024-11-22 ENCOUNTER — LAB VISIT (OUTPATIENT)
Dept: LAB | Facility: HOSPITAL | Age: 49
End: 2024-11-22
Payer: COMMERCIAL

## 2024-11-22 DIAGNOSIS — E11.9 DIABETES MELLITUS WITHOUT COMPLICATION: ICD-10-CM

## 2024-11-22 DIAGNOSIS — E66.01 MORBID OBESITY: ICD-10-CM

## 2024-11-22 LAB
ALBUMIN SERPL BCP-MCNC: 4.4 G/DL (ref 3.5–5.2)
ALP SERPL-CCNC: 68 U/L (ref 40–150)
ALT SERPL W/O P-5'-P-CCNC: 25 U/L (ref 10–44)
ANION GAP SERPL CALC-SCNC: 12 MMOL/L (ref 8–16)
AST SERPL-CCNC: 16 U/L (ref 10–40)
BASOPHILS # BLD AUTO: 0.02 K/UL (ref 0–0.2)
BASOPHILS NFR BLD: 0.4 % (ref 0–1.9)
BILIRUB SERPL-MCNC: 0.4 MG/DL (ref 0.1–1)
BUN SERPL-MCNC: 11 MG/DL (ref 6–20)
CALCIUM SERPL-MCNC: 9.5 MG/DL (ref 8.7–10.5)
CHLORIDE SERPL-SCNC: 103 MMOL/L (ref 95–110)
CO2 SERPL-SCNC: 26 MMOL/L (ref 23–29)
CREAT SERPL-MCNC: 1.1 MG/DL (ref 0.5–1.4)
DIFFERENTIAL METHOD BLD: ABNORMAL
EOSINOPHIL # BLD AUTO: 0.2 K/UL (ref 0–0.5)
EOSINOPHIL NFR BLD: 4.2 % (ref 0–8)
ERYTHROCYTE [DISTWIDTH] IN BLOOD BY AUTOMATED COUNT: 14.9 % (ref 11.5–14.5)
EST. GFR  (NO RACE VARIABLE): >60 ML/MIN/1.73 M^2
GLUCOSE SERPL-MCNC: 147 MG/DL (ref 70–110)
HCT VFR BLD AUTO: 40.8 % (ref 40–54)
HGB BLD-MCNC: 12.4 G/DL (ref 14–18)
IMM GRANULOCYTES # BLD AUTO: 0.01 K/UL (ref 0–0.04)
IMM GRANULOCYTES NFR BLD AUTO: 0.2 % (ref 0–0.5)
LYMPHOCYTES # BLD AUTO: 1.3 K/UL (ref 1–4.8)
LYMPHOCYTES NFR BLD: 22.6 % (ref 18–48)
MCH RBC QN AUTO: 23.1 PG (ref 27–31)
MCHC RBC AUTO-ENTMCNC: 30.4 G/DL (ref 32–36)
MCV RBC AUTO: 76 FL (ref 82–98)
MONOCYTES # BLD AUTO: 0.4 K/UL (ref 0.3–1)
MONOCYTES NFR BLD: 6.9 % (ref 4–15)
NEUTROPHILS # BLD AUTO: 3.7 K/UL (ref 1.8–7.7)
NEUTROPHILS NFR BLD: 65.7 % (ref 38–73)
NRBC BLD-RTO: 0 /100 WBC
PLATELET # BLD AUTO: 253 K/UL (ref 150–450)
PMV BLD AUTO: 10.8 FL (ref 9.2–12.9)
POTASSIUM SERPL-SCNC: 4.1 MMOL/L (ref 3.5–5.1)
PROT SERPL-MCNC: 8.7 G/DL (ref 6–8.4)
RBC # BLD AUTO: 5.36 M/UL (ref 4.6–6.2)
SODIUM SERPL-SCNC: 141 MMOL/L (ref 136–145)
WBC # BLD AUTO: 5.67 K/UL (ref 3.9–12.7)

## 2024-11-22 PROCEDURE — 86141 C-REACTIVE PROTEIN HS: CPT

## 2024-11-22 PROCEDURE — 80053 COMPREHEN METABOLIC PANEL: CPT | Mod: PO

## 2024-11-22 PROCEDURE — 85652 RBC SED RATE AUTOMATED: CPT

## 2024-11-22 PROCEDURE — 36415 COLL VENOUS BLD VENIPUNCTURE: CPT | Mod: PO

## 2024-11-22 PROCEDURE — 85025 COMPLETE CBC W/AUTO DIFF WBC: CPT | Mod: PO

## 2024-11-22 PROCEDURE — 84134 ASSAY OF PREALBUMIN: CPT

## 2024-11-22 PROCEDURE — 83036 HEMOGLOBIN GLYCOSYLATED A1C: CPT

## 2024-11-23 LAB
CRP SERPL-MCNC: 8.78 MG/L (ref 0–3.19)
ERYTHROCYTE [SEDIMENTATION RATE] IN BLOOD BY PHOTOMETRIC METHOD: 92 MM/HR (ref 0–23)
ESTIMATED AVG GLUCOSE: 166 MG/DL (ref 68–131)
HBA1C MFR BLD: 7.4 % (ref 4–5.6)
PREALB SERPL-MCNC: 26 MG/DL (ref 20–43)

## 2024-12-30 ENCOUNTER — LAB VISIT (OUTPATIENT)
Dept: LAB | Facility: HOSPITAL | Age: 49
End: 2024-12-30
Payer: COMMERCIAL

## 2024-12-30 DIAGNOSIS — E66.01 MORBID OBESITY: ICD-10-CM

## 2024-12-30 DIAGNOSIS — I10 ESSENTIAL HYPERTENSION, MALIGNANT: Primary | ICD-10-CM

## 2024-12-30 LAB
ALBUMIN SERPL BCP-MCNC: 3.8 G/DL (ref 3.5–5.2)
ALP SERPL-CCNC: 66 U/L (ref 40–150)
ALT SERPL W/O P-5'-P-CCNC: 33 U/L (ref 10–44)
ANION GAP SERPL CALC-SCNC: 14 MMOL/L (ref 8–16)
AST SERPL-CCNC: 19 U/L (ref 10–40)
BASOPHILS # BLD AUTO: 0.02 K/UL (ref 0–0.2)
BASOPHILS NFR BLD: 0.4 % (ref 0–1.9)
BILIRUB SERPL-MCNC: 0.4 MG/DL (ref 0.1–1)
BUN SERPL-MCNC: 8 MG/DL (ref 6–20)
CALCIUM SERPL-MCNC: 9.6 MG/DL (ref 8.7–10.5)
CHLORIDE SERPL-SCNC: 100 MMOL/L (ref 95–110)
CO2 SERPL-SCNC: 26 MMOL/L (ref 23–29)
CREAT SERPL-MCNC: 1.1 MG/DL (ref 0.5–1.4)
CRP SERPL-MCNC: 19.8 MG/L (ref 0–8.2)
CRP SERPL-MCNC: 19.8 MG/L (ref 0–8.2)
DIFFERENTIAL METHOD BLD: ABNORMAL
EOSINOPHIL # BLD AUTO: 0.1 K/UL (ref 0–0.5)
EOSINOPHIL NFR BLD: 1.7 % (ref 0–8)
ERYTHROCYTE [DISTWIDTH] IN BLOOD BY AUTOMATED COUNT: 14.7 % (ref 11.5–14.5)
EST. GFR  (NO RACE VARIABLE): >60 ML/MIN/1.73 M^2
GLUCOSE SERPL-MCNC: 144 MG/DL (ref 70–110)
HCT VFR BLD AUTO: 39.5 % (ref 40–54)
HGB BLD-MCNC: 12.4 G/DL (ref 14–18)
IMM GRANULOCYTES # BLD AUTO: 0.01 K/UL (ref 0–0.04)
IMM GRANULOCYTES NFR BLD AUTO: 0.2 % (ref 0–0.5)
LYMPHOCYTES # BLD AUTO: 1.2 K/UL (ref 1–4.8)
LYMPHOCYTES NFR BLD: 24.3 % (ref 18–48)
MCH RBC QN AUTO: 23.4 PG (ref 27–31)
MCHC RBC AUTO-ENTMCNC: 31.4 G/DL (ref 32–36)
MCV RBC AUTO: 74 FL (ref 82–98)
MONOCYTES # BLD AUTO: 0.4 K/UL (ref 0.3–1)
MONOCYTES NFR BLD: 8.7 % (ref 4–15)
NEUTROPHILS # BLD AUTO: 3.1 K/UL (ref 1.8–7.7)
NEUTROPHILS NFR BLD: 64.7 % (ref 38–73)
NRBC BLD-RTO: 0 /100 WBC
PLATELET # BLD AUTO: 311 K/UL (ref 150–450)
PMV BLD AUTO: 11.1 FL (ref 9.2–12.9)
POTASSIUM SERPL-SCNC: 3.5 MMOL/L (ref 3.5–5.1)
PROT SERPL-MCNC: 8 G/DL (ref 6–8.4)
RBC # BLD AUTO: 5.31 M/UL (ref 4.6–6.2)
SODIUM SERPL-SCNC: 140 MMOL/L (ref 136–145)
WBC # BLD AUTO: 4.81 K/UL (ref 3.9–12.7)

## 2024-12-30 PROCEDURE — 85025 COMPLETE CBC W/AUTO DIFF WBC: CPT | Mod: PO

## 2024-12-30 PROCEDURE — 80053 COMPREHEN METABOLIC PANEL: CPT | Mod: PO

## 2024-12-30 PROCEDURE — 86140 C-REACTIVE PROTEIN: CPT | Mod: PO

## 2024-12-30 PROCEDURE — 85652 RBC SED RATE AUTOMATED: CPT

## 2024-12-30 PROCEDURE — 84134 ASSAY OF PREALBUMIN: CPT

## 2024-12-31 LAB
ERYTHROCYTE [SEDIMENTATION RATE] IN BLOOD BY PHOTOMETRIC METHOD: 118 MM/HR (ref 0–23)
PREALB SERPL-MCNC: 24 MG/DL (ref 20–43)

## 2025-04-26 ENCOUNTER — HOSPITAL ENCOUNTER (INPATIENT)
Facility: HOSPITAL | Age: 50
LOS: 1 days | Discharge: HOME | DRG: 281 | End: 2025-04-28
Attending: EMERGENCY MEDICINE | Admitting: STUDENT IN AN ORGANIZED HEALTH CARE EDUCATION/TRAINING PROGRAM
Payer: COMMERCIAL

## 2025-04-26 DIAGNOSIS — I21.4 NON-ST ELEVATION MYOCARDIAL INFARCTION (NSTEMI): ICD-10-CM

## 2025-04-26 DIAGNOSIS — I21.4 NSTEMI (NON-ST ELEVATION MYOCARDIAL INFARCTION): ICD-10-CM

## 2025-04-26 DIAGNOSIS — R07.9 CHEST PAIN: ICD-10-CM

## 2025-04-26 DIAGNOSIS — I21.4 NSTEMI (NON-ST ELEVATED MYOCARDIAL INFARCTION): Primary | ICD-10-CM

## 2025-04-26 LAB
ABSOLUTE EOSINOPHIL (OHS): 0.13 K/UL
ABSOLUTE MONOCYTE (OHS): 0.46 K/UL (ref 0.3–1)
ABSOLUTE NEUTROPHIL COUNT (OHS): 3.88 K/UL (ref 1.8–7.7)
ALBUMIN SERPL BCP-MCNC: 3.9 G/DL (ref 3.5–5.2)
ALP SERPL-CCNC: 65 UNIT/L (ref 40–150)
ALT SERPL W/O P-5'-P-CCNC: 20 UNIT/L (ref 10–44)
ANION GAP (OHS): 12 MMOL/L (ref 8–16)
AST SERPL-CCNC: 15 UNIT/L (ref 11–45)
BACTERIA #/AREA URNS HPF: NORMAL /HPF
BASOPHILS # BLD AUTO: 0.01 K/UL
BASOPHILS NFR BLD AUTO: 0.2 %
BILIRUB SERPL-MCNC: 0.4 MG/DL (ref 0.1–1)
BILIRUB UR QL STRIP.AUTO: NEGATIVE
BNP SERPL-MCNC: <10 PG/ML (ref 0–99)
BUN SERPL-MCNC: 11 MG/DL (ref 6–20)
CALCIUM SERPL-MCNC: 9.3 MG/DL (ref 8.7–10.5)
CHLORIDE SERPL-SCNC: 104 MMOL/L (ref 95–110)
CK SERPL-CCNC: 191 U/L (ref 20–200)
CLARITY UR: CLEAR
CO2 SERPL-SCNC: 23 MMOL/L (ref 23–29)
COLOR UR AUTO: YELLOW
CREAT SERPL-MCNC: 1.2 MG/DL (ref 0.5–1.4)
ERYTHROCYTE [DISTWIDTH] IN BLOOD BY AUTOMATED COUNT: 14.4 % (ref 11.5–14.5)
GFR SERPLBLD CREATININE-BSD FMLA CKD-EPI: >60 ML/MIN/1.73/M2
GLUCOSE SERPL-MCNC: 113 MG/DL (ref 70–110)
GLUCOSE UR QL STRIP: NEGATIVE
HCT VFR BLD AUTO: 37.4 % (ref 40–54)
HGB BLD-MCNC: 11.6 GM/DL (ref 14–18)
HGB UR QL STRIP: NEGATIVE
HOLD SPECIMEN: NORMAL
HYALINE CASTS #/AREA URNS LPF: 0 /LPF (ref 0–1)
IMM GRANULOCYTES # BLD AUTO: 0.01 K/UL (ref 0–0.04)
IMM GRANULOCYTES NFR BLD AUTO: 0.2 % (ref 0–0.5)
KETONES UR QL STRIP: NEGATIVE
LEUKOCYTE ESTERASE UR QL STRIP: NEGATIVE
LYMPHOCYTES # BLD AUTO: 1.12 K/UL (ref 1–4.8)
MCH RBC QN AUTO: 23.2 PG (ref 27–31)
MCHC RBC AUTO-ENTMCNC: 31 G/DL (ref 32–36)
MCV RBC AUTO: 75 FL (ref 82–98)
MICROSCOPIC COMMENT: NORMAL
NITRITE UR QL STRIP: NEGATIVE
NUCLEATED RBC (/100WBC) (OHS): 0 /100 WBC
PH UR STRIP: 8 [PH]
PLATELET # BLD AUTO: 200 K/UL (ref 150–450)
PMV BLD AUTO: 10.6 FL (ref 9.2–12.9)
POTASSIUM SERPL-SCNC: 4.2 MMOL/L (ref 3.5–5.1)
PROT SERPL-MCNC: 8.6 GM/DL (ref 6–8.4)
PROT UR QL STRIP: ABNORMAL
RBC # BLD AUTO: 5.01 M/UL (ref 4.6–6.2)
RBC #/AREA URNS HPF: 0 /HPF (ref 0–4)
RELATIVE EOSINOPHIL (OHS): 2.3 %
RELATIVE LYMPHOCYTE (OHS): 20 % (ref 18–48)
RELATIVE MONOCYTE (OHS): 8.2 % (ref 4–15)
RELATIVE NEUTROPHIL (OHS): 69.1 % (ref 38–73)
SODIUM SERPL-SCNC: 139 MMOL/L (ref 136–145)
SP GR UR STRIP: 1.01
TROPONIN I SERPL DL<=0.01 NG/ML-MCNC: 0.21 NG/ML
TROPONIN I SERPL DL<=0.01 NG/ML-MCNC: 0.27 NG/ML
UROBILINOGEN UR STRIP-ACNC: NEGATIVE EU/DL
WBC # BLD AUTO: 5.61 K/UL (ref 3.9–12.7)
WBC #/AREA URNS HPF: 0 /HPF (ref 0–5)

## 2025-04-26 PROCEDURE — 81003 URINALYSIS AUTO W/O SCOPE: CPT | Performed by: EMERGENCY MEDICINE

## 2025-04-26 PROCEDURE — 85025 COMPLETE CBC W/AUTO DIFF WBC: CPT | Performed by: EMERGENCY MEDICINE

## 2025-04-26 PROCEDURE — 84450 TRANSFERASE (AST) (SGOT): CPT | Performed by: EMERGENCY MEDICINE

## 2025-04-26 PROCEDURE — 80053 COMPREHEN METABOLIC PANEL: CPT | Performed by: EMERGENCY MEDICINE

## 2025-04-26 PROCEDURE — 81001 URINALYSIS AUTO W/SCOPE: CPT | Performed by: EMERGENCY MEDICINE

## 2025-04-26 PROCEDURE — 84484 ASSAY OF TROPONIN QUANT: CPT | Performed by: EMERGENCY MEDICINE

## 2025-04-26 PROCEDURE — 25000003 PHARM REV CODE 250: Mod: ER | Performed by: EMERGENCY MEDICINE

## 2025-04-26 PROCEDURE — 93005 ELECTROCARDIOGRAM TRACING: CPT | Mod: ER

## 2025-04-26 PROCEDURE — 96372 THER/PROPH/DIAG INJ SC/IM: CPT | Performed by: EMERGENCY MEDICINE

## 2025-04-26 PROCEDURE — 83880 ASSAY OF NATRIURETIC PEPTIDE: CPT | Performed by: EMERGENCY MEDICINE

## 2025-04-26 PROCEDURE — 82550 ASSAY OF CK (CPK): CPT | Performed by: EMERGENCY MEDICINE

## 2025-04-26 PROCEDURE — G0378 HOSPITAL OBSERVATION PER HR: HCPCS | Mod: ER

## 2025-04-26 PROCEDURE — 63600175 PHARM REV CODE 636 W HCPCS: Mod: ER | Performed by: EMERGENCY MEDICINE

## 2025-04-26 PROCEDURE — 99285 EMERGENCY DEPT VISIT HI MDM: CPT | Mod: 25,ER

## 2025-04-26 PROCEDURE — 93010 ELECTROCARDIOGRAM REPORT: CPT | Mod: ,,, | Performed by: INTERNAL MEDICINE

## 2025-04-26 RX ORDER — ASPIRIN 325 MG
325 TABLET ORAL
Status: COMPLETED | OUTPATIENT
Start: 2025-04-26 | End: 2025-04-26

## 2025-04-26 RX ORDER — METFORMIN HYDROCHLORIDE 500 MG/1
500 TABLET, EXTENDED RELEASE ORAL 2 TIMES DAILY
Status: ON HOLD | COMMUNITY
Start: 2025-04-14 | End: 2025-04-28

## 2025-04-26 RX ORDER — TIRZEPATIDE 2.5 MG/.5ML
2.5 INJECTION, SOLUTION SUBCUTANEOUS WEEKLY
COMMUNITY
Start: 2025-04-07

## 2025-04-26 RX ORDER — POTASSIUM CHLORIDE 20 MEQ/1
20 TABLET, EXTENDED RELEASE ORAL DAILY
COMMUNITY
Start: 2025-04-14

## 2025-04-26 RX ORDER — BENAZEPRIL HYDROCHLORIDE 40 MG/1
40 TABLET ORAL DAILY
COMMUNITY
Start: 2025-04-14

## 2025-04-26 RX ORDER — SIMVASTATIN 20 MG/1
20 TABLET, FILM COATED ORAL NIGHTLY
Status: ON HOLD | COMMUNITY
End: 2025-04-28 | Stop reason: HOSPADM

## 2025-04-26 RX ORDER — SITAGLIPTIN 50 MG/1
50 TABLET, FILM COATED ORAL DAILY
COMMUNITY
Start: 2025-04-22

## 2025-04-26 RX ORDER — ENOXAPARIN SODIUM 150 MG/ML
1 INJECTION SUBCUTANEOUS
Status: COMPLETED | OUTPATIENT
Start: 2025-04-26 | End: 2025-04-26

## 2025-04-26 RX ADMIN — ENOXAPARIN SODIUM 150 MG: 150 INJECTION SUBCUTANEOUS at 08:04

## 2025-04-26 RX ADMIN — ASPIRIN 325 MG: 325 TABLET ORAL at 08:04

## 2025-04-26 NOTE — ED PROVIDER NOTES
Encounter Date: 4/26/2025       History     Chief Complaint   Patient presents with    Chest Pain     Since 2000 last night. Brought in by [facility]. Pt received nitro spray X2 and one inch of nitro paste     The history is provided by the patient and the EMS personnel.   Chest Pain  The current episode started yesterday. Chest pain occurs constantly. The chest pain is improving. Pertinent negatives for primary symptoms include no fever, no fatigue, no shortness of breath, no cough, no nausea and no vomiting.   Pertinent negatives for associated symptoms include no numbness and no weakness.     Review of patient's allergies indicates:   Allergen Reactions    Dilantin infatabs [phenytoin]      Past Medical History:   Diagnosis Date    CAD (coronary artery disease)     Chronic wound     Diabetes mellitus     Diabetic peripheral angiopathy     Essential (primary) hypertension     Hypertension     Lymphedema     Traumatic brain injury      Past Surgical History:   Procedure Laterality Date    ABDOMINAL SURGERY      DEBRIDEMENT       No family history on file.  Social History[1]  Review of Systems   Constitutional:  Negative for chills, fatigue and fever.   HENT:  Negative for congestion.    Respiratory:  Negative for cough and shortness of breath.    Cardiovascular:  Positive for chest pain.   Gastrointestinal:  Negative for diarrhea, nausea and vomiting.   Genitourinary:  Negative for dysuria.   Neurological:  Negative for weakness and numbness.       Physical Exam     Initial Vitals [04/26/25 1547]   BP Pulse Resp Temp SpO2   (!) 158/91 103 19 98.1 °F (36.7 °C) 96 %      MAP       --         Physical Exam    Constitutional: He appears well-developed and well-nourished. No distress.   HENT:   Head: Normocephalic and atraumatic.   Eyes: Conjunctivae are normal. Pupils are equal, round, and reactive to light.   Neck: Neck supple.   Normal range of motion.  Cardiovascular:  Normal rate, regular rhythm and normal heart sounds.            Pulmonary/Chest: Breath sounds normal.   Abdominal: Abdomen is soft. Bowel sounds are normal.   Musculoskeletal:         General: Edema (3+) present. Normal range of motion.      Cervical back: Normal range of motion and neck supple.     Neurological: He is alert and oriented to person, place, and time. No cranial nerve deficit.   Chronic contractures to LUE.   Skin: Skin is warm and dry.   Psychiatric: He has a normal mood and affect.         ED Course   Procedures  Labs Reviewed   COMPREHENSIVE METABOLIC PANEL - Abnormal       Result Value    Sodium 139      Potassium 4.2      Chloride 104      CO2 23      Glucose 113 (*)     BUN 11      Creatinine 1.2      Calcium 9.3      Protein Total 8.6 (*)     Albumin 3.9      Bilirubin Total 0.4      ALP 65      AST 15      ALT 20      Anion Gap 12      eGFR >60     URINALYSIS, REFLEX TO URINE CULTURE - Abnormal    Color, UA Yellow      Appearance, UA Clear      pH, UA 8.0      Spec Grav UA 1.015      Protein, UA 2+ (*)     Glucose, UA Negative      Ketones, UA Negative      Bilirubin, UA Negative      Blood, UA Negative      Nitrites, UA Negative      Urobilinogen, UA Negative      Leukocyte Esterase, UA Negative     TROPONIN I - Abnormal    Troponin-I 0.212 (*)    CBC WITH DIFFERENTIAL - Abnormal    WBC 5.61      RBC 5.01      HGB 11.6 (*)     HCT 37.4 (*)     MCV 75 (*)     MCH 23.2 (*)     MCHC 31.0 (*)     RDW 14.4      Platelet Count 200      MPV 10.6      Nucleated RBC 0      Neut % 69.1      Lymph % 20.0      Mono % 8.2      Eos % 2.3      Basophil % 0.2      Imm Grans % 0.2      Neut # 3.88      Lymph # 1.12      Mono # 0.46      Eos # 0.13      Baso # 0.01      Imm Grans # 0.01     TROPONIN I - Abnormal    Troponin-I 0.271 (*)    B-TYPE NATRIURETIC PEPTIDE - Normal    BNP <10     CK - Normal         CBC W/ AUTO DIFFERENTIAL    Narrative:     The following orders were created for panel order CBC Auto Differential.  Procedure                                Abnormality         Status                     ---------                               -----------         ------                     CBC with Differential[[id number]]       Abnormal            Final result                 Please view results for these tests on the individual orders.   URINALYSIS MICROSCOPIC    RBC, UA 0      WBC, UA 0      Bacteria, UA None      Hyaline Casts, UA 0      Microscopic Comment       GREY TOP URINE HOLD     EKG Readings: (Independently Interpreted)   Rhythm: Normal Sinus Rhythm. Heart Rate: 100. Ectopy: No Ectopy. Conduction: Normal. ST Segments: Normal ST Segments. T Waves: Normal. Clinical Impression: Normal Sinus Rhythm     ECG Results              EKG 12-lead (In process)        Collection Time Result Time QRS Duration OHS QTC Calculation    04/26/25 15:45:40 04/26/25 19:55:24 100 469                     In process by Interface, Lab In [facility] (04/26/25 19:55:28)                   Narrative:    Test Reason : R07.9,    Vent. Rate : 100 BPM     Atrial Rate : 100 BPM     P-R Int : 158 ms          QRS Dur : 100 ms      QT Int : 364 ms       P-R-T Axes :  55  -1  60 degrees    QTcB Int : 469 ms    Sinus rhythm with Premature atrial complexes  Otherwise normal ECG  When compared with ECG of 20-May-2019 10:05,  Premature atrial complexes are now Present  QT has lengthened    Referred By: AAAREFERRAL SELF           Confirmed By:                                   Imaging Results              X-Ray Chest AP Portable (Final result)  Result time 04/26/25 17:11:24      Final result by Chance Huynh DO (04/26/25 17:11:24)                   Impression:    No acute cardiopulmonary disease.    Finalized on: 4/26/2025 5:11 PM By:  Chance Huynh  [facility]# [id number]      2025-04-26 17:13:29.179     [facility]               Narrative:    Exam: XR CHEST AP PORTABLE    Comparison: None    Clinical Indication:  Chest pain    Findings: Heart size and pulmonary vasculature are unremarkable.  No focal  "consolidation, pleural effusions or pneumothorax.    No acute angulated or displaced fractures.                                    8:01 PM Discussed lab/imaging studies with patient and the need for further evaluation/admission for CP. Pt verbalized understanding that this is a stand alone ER and we are unable to admit at this facility. Pt will be transferred to [facility] via [facility] with care en route to include cm. I discussed this case with hs and care was accepted by Dr Hurtado.       Medications   aspirin tablet 325 mg (has no administration in time range)   enoxaparin injection 150 mg (has no administration in time range)     Medical Decision Making  DDx ACS, NSTEMI, CHF, MS pain, Costochondritis    Problems Addressed:  Chest pain: acute illness or injury    Amount and/or Complexity of Data Reviewed  Labs: ordered.  Radiology: ordered.  ECG/medicine tests: ordered and independent interpretation performed. Decision-making details documented in ED Course.  Discussion of management or test interpretation with external provider(s): Cardiology Consult- discussed case with Dr Erik Padron X 1 in ED trend trops    Risk  OTC drugs.  Prescription drug management.  Decision regarding hospitalization.               ED Course as of 04/26/25 2002   Sat Apr 26, 2025   1716 First troponin elevated, but appears to be chronically elevated. Will repeat troponin and DC if similar, admit if significant elevation.  [NF]      ED Course User Index  [NF] Carroll Peguero MD                           Clinical Impression:  Final diagnoses:  [R07.9] Chest pain  [I21.4] NSTEMI (non-ST elevated myocardial infarction) (Primary)          ED Disposition Condition    Observation                   [1]   Social History  Tobacco Use    Smoking status: Never    Smokeless tobacco: Never   Substance Use Topics    Alcohol use: Yes     Comment: "2 or 3 beers" daily     Drug use: Yes     Types: Marijuana     Comment: occassionally  "        Israel Christianson MD  04/26/25 2002

## 2025-04-27 PROBLEM — I21.4 NSTEMI (NON-ST ELEVATED MYOCARDIAL INFARCTION): Status: ACTIVE | Noted: 2025-04-27

## 2025-04-27 PROBLEM — E11.9 TYPE 2 DIABETES MELLITUS, WITHOUT LONG-TERM CURRENT USE OF INSULIN: Chronic | Status: ACTIVE | Noted: 2025-04-27

## 2025-04-27 PROBLEM — E66.01 MORBID OBESITY: Chronic | Status: ACTIVE | Noted: 2025-04-27

## 2025-04-27 PROBLEM — I10 BENIGN ESSENTIAL HTN: Chronic | Status: ACTIVE | Noted: 2025-04-27

## 2025-04-27 PROBLEM — R07.9 CHEST PAIN: Status: ACTIVE | Noted: 2025-04-27

## 2025-04-27 PROBLEM — I89.0 LYMPHEDEMA: Chronic | Status: ACTIVE | Noted: 2025-04-27

## 2025-04-27 LAB
ABORH RETYPE: NORMAL
ABSOLUTE EOSINOPHIL (OHS): 0.15 K/UL
ABSOLUTE MONOCYTE (OHS): 0.42 K/UL (ref 0.3–1)
ABSOLUTE NEUTROPHIL COUNT (OHS): 4.33 K/UL (ref 1.8–7.7)
ANION GAP (OHS): 6 MMOL/L (ref 8–16)
AORTIC ROOT ANNULUS: 3.1 CM
ASCENDING AORTA: 2.4 CM
AV INDEX (PROSTH): 0.94
AV MEAN GRADIENT: 4 MMHG
AV PEAK GRADIENT: 6 MMHG
AV VALVE AREA BY VELOCITY RATIO: 3.2 CM²
AV VALVE AREA: 3.6 CM²
AV VELOCITY RATIO: 0.83
BASOPHILS # BLD AUTO: 0.03 K/UL
BASOPHILS NFR BLD AUTO: 0.5 %
BSA FOR ECHO PROCEDURE: 2.58 M2
BUN SERPL-MCNC: 10 MG/DL (ref 6–20)
CALCIUM SERPL-MCNC: 8.5 MG/DL (ref 8.7–10.5)
CHLORIDE SERPL-SCNC: 106 MMOL/L (ref 95–110)
CHOLEST SERPL-MCNC: 148 MG/DL (ref 120–199)
CHOLEST/HDLC SERPL: 4.2 {RATIO} (ref 2–5)
CO2 SERPL-SCNC: 24 MMOL/L (ref 23–29)
CREAT SERPL-MCNC: 1 MG/DL (ref 0.5–1.4)
CV ECHO LV RWT: 0.3 CM
DOP CALC AO PEAK VEL: 1.2 M/S
DOP CALC AO VTI: 23.3 CM
DOP CALC LVOT AREA: 3.8 CM2
DOP CALC LVOT DIAMETER: 2.2 CM
DOP CALC LVOT PEAK VEL: 1 M/S
DOP CALC LVOT STROKE VOLUME: 83.2 CM3
DOP CALC RVOT PEAK VEL: 0.64 M/S
DOP CALC RVOT VTI: 11.4 CM
DOP CALCLVOT PEAK VEL VTI: 21.9 CM
E WAVE DECELERATION TIME: 246 MSEC
E/A RATIO: 0.99
E/E' RATIO: 9 M/S
EAG (OHS): 148 MG/DL (ref 68–131)
ECHO LV POSTERIOR WALL: 0.8 CM (ref 0.6–1.1)
ERYTHROCYTE [DISTWIDTH] IN BLOOD BY AUTOMATED COUNT: 14 % (ref 11.5–14.5)
FRACTIONAL SHORTENING: 25.9 % (ref 28–44)
GFR SERPLBLD CREATININE-BSD FMLA CKD-EPI: >60 ML/MIN/1.73/M2
GLUCOSE SERPL-MCNC: 96 MG/DL (ref 70–110)
HBA1C MFR BLD: 6.8 % (ref 4–5.6)
HCT VFR BLD AUTO: 37.4 % (ref 40–54)
HDLC SERPL-MCNC: 35 MG/DL (ref 40–75)
HDLC SERPL: 23.6 % (ref 20–50)
HGB BLD-MCNC: 11.3 GM/DL (ref 14–18)
IMM GRANULOCYTES # BLD AUTO: 0.01 K/UL (ref 0–0.04)
IMM GRANULOCYTES NFR BLD AUTO: 0.2 % (ref 0–0.5)
INDIRECT COOMBS: NORMAL
INTERVENTRICULAR SEPTUM: 0.9 CM (ref 0.6–1.1)
IVRT: 114 MSEC
LA MAJOR: 6.1 CM
LA MINOR: 5.9 CM
LA WIDTH: 4.2 CM
LDLC SERPL CALC-MCNC: 92.2 MG/DL (ref 63–159)
LEFT ATRIUM AREA SYSTOLIC (APICAL 2 CHAMBER): 25.2 CM2
LEFT ATRIUM AREA SYSTOLIC (APICAL 4 CHAMBER): 20.53 CM2
LEFT ATRIUM SIZE: 3.4 CM
LEFT ATRIUM VOLUME INDEX MOD: 29 ML/M2
LEFT ATRIUM VOLUME INDEX: 30 ML/M2
LEFT ATRIUM VOLUME MOD: 71 ML
LEFT ATRIUM VOLUME: 73 CM3
LEFT INTERNAL DIMENSION IN SYSTOLE: 4 CM (ref 2.1–4)
LEFT VENTRICLE DIASTOLIC VOLUME INDEX: 59.5 ML/M2
LEFT VENTRICLE DIASTOLIC VOLUME: 144 ML
LEFT VENTRICLE END SYSTOLIC VOLUME APICAL 2 CHAMBER: 87.73 ML
LEFT VENTRICLE END SYSTOLIC VOLUME APICAL 4 CHAMBER: 56.49 ML
LEFT VENTRICLE MASS INDEX: 69.2 G/M2
LEFT VENTRICLE SYSTOLIC VOLUME INDEX: 28.5 ML/M2
LEFT VENTRICLE SYSTOLIC VOLUME: 69 ML
LEFT VENTRICULAR INTERNAL DIMENSION IN DIASTOLE: 5.4 CM (ref 3.5–6)
LEFT VENTRICULAR MASS: 167.4 G
LV LATERAL E/E' RATIO: 7.9 M/S
LV SEPTAL E/E' RATIO: 9.9 M/S
LVED V (TEICH): 143.81 ML
LVES V (TEICH): 68.88 ML
LVOT MG: 1.99 MMHG
LVOT MV: 0.67 CM/S
LYMPHOCYTES # BLD AUTO: 1.59 K/UL (ref 1–4.8)
MCH RBC QN AUTO: 22.7 PG (ref 27–31)
MCHC RBC AUTO-ENTMCNC: 30.2 G/DL (ref 32–36)
MCV RBC AUTO: 75 FL (ref 82–98)
MV PEAK A VEL: 0.8 M/S
MV PEAK E VEL: 0.79 M/S
MV STENOSIS PRESSURE HALF TIME: 71.45 MS
MV VALVE AREA P 1/2 METHOD: 3.08 CM2
NONHDLC SERPL-MCNC: 113 MG/DL
NUCLEATED RBC (/100WBC) (OHS): 0 /100 WBC
OHS CV RV/LV RATIO: 0.56 CM
OHS QRS DURATION: 100 MS
OHS QTC CALCULATION: 469 MS
PISA TR MAX VEL: 2.3 M/S
PLATELET # BLD AUTO: 217 K/UL (ref 150–450)
PMV BLD AUTO: 10.3 FL (ref 9.2–12.9)
POCT GLUCOSE: 106 MG/DL (ref 70–110)
POCT GLUCOSE: 111 MG/DL (ref 70–110)
POCT GLUCOSE: 112 MG/DL (ref 70–110)
POTASSIUM SERPL-SCNC: 3.9 MMOL/L (ref 3.5–5.1)
PV MEAN GRADIENT: 1 MMHG
PV MV: 0.63 M/S
PV PEAK GRADIENT: 3 MMHG
PV PEAK VELOCITY: 0.86 M/S
RA MAJOR: 4.93 CM
RA PRESSURE ESTIMATED: 8 MMHG
RA WIDTH: 3.8 CM
RBC # BLD AUTO: 4.98 M/UL (ref 4.6–6.2)
RELATIVE EOSINOPHIL (OHS): 2.3 %
RELATIVE LYMPHOCYTE (OHS): 24.3 % (ref 18–48)
RELATIVE MONOCYTE (OHS): 6.4 % (ref 4–15)
RELATIVE NEUTROPHIL (OHS): 66.3 % (ref 38–73)
RH BLD: NORMAL
RIGHT VENTRICLE DIASTOLIC BASEL DIMENSION: 3 CM
RIGHT VENTRICULAR END-DIASTOLIC DIMENSION: 3 CM
RV TB RVSP: 10 MMHG
RV TISSUE DOPPLER FREE WALL SYSTOLIC VELOCITY 1 (APICAL 4 CHAMBER VIEW): 9.67 CM/S
SODIUM SERPL-SCNC: 136 MMOL/L (ref 136–145)
SPECIMEN OUTDATE: NORMAL
STJ: 2.3 CM
TDI LATERAL: 0.1 M/S
TDI SEPTAL: 0.08 M/S
TDI: 0.09 M/S
TR MAX PG: 20 MMHG
TRICUSPID ANNULAR PLANE SYSTOLIC EXCURSION: 2.7 CM
TRIGL SERPL-MCNC: 104 MG/DL (ref 30–150)
TROPONIN I SERPL DL<=0.01 NG/ML-MCNC: 0.22 NG/ML
TROPONIN I SERPL DL<=0.01 NG/ML-MCNC: 0.23 NG/ML
TROPONIN I SERPL DL<=0.01 NG/ML-MCNC: 0.24 NG/ML
TV REST PULMONARY ARTERY PRESSURE: 29 MMHG
WBC # BLD AUTO: 6.53 K/UL (ref 3.9–12.7)
Z-SCORE OF LEFT VENTRICULAR DIMENSION IN END DIASTOLE: -7.51
Z-SCORE OF LEFT VENTRICULAR DIMENSION IN END SYSTOLE: -4.24

## 2025-04-27 PROCEDURE — 86850 RBC ANTIBODY SCREEN: CPT | Performed by: INTERNAL MEDICINE

## 2025-04-27 PROCEDURE — 36415 COLL VENOUS BLD VENIPUNCTURE: CPT | Performed by: INTERNAL MEDICINE

## 2025-04-27 PROCEDURE — 80048 BASIC METABOLIC PNL TOTAL CA: CPT | Performed by: INTERNAL MEDICINE

## 2025-04-27 PROCEDURE — 80061 LIPID PANEL: CPT | Performed by: INTERNAL MEDICINE

## 2025-04-27 PROCEDURE — 82465 ASSAY BLD/SERUM CHOLESTEROL: CPT | Performed by: INTERNAL MEDICINE

## 2025-04-27 PROCEDURE — 25000003 PHARM REV CODE 250: Performed by: INTERNAL MEDICINE

## 2025-04-27 PROCEDURE — G0378 HOSPITAL OBSERVATION PER HR: HCPCS | Mod: ER

## 2025-04-27 PROCEDURE — G0378 HOSPITAL OBSERVATION PER HR: HCPCS

## 2025-04-27 PROCEDURE — 96372 THER/PROPH/DIAG INJ SC/IM: CPT | Performed by: INTERNAL MEDICINE

## 2025-04-27 PROCEDURE — 85025 COMPLETE CBC W/AUTO DIFF WBC: CPT | Performed by: INTERNAL MEDICINE

## 2025-04-27 PROCEDURE — 83036 HEMOGLOBIN GLYCOSYLATED A1C: CPT | Performed by: INTERNAL MEDICINE

## 2025-04-27 PROCEDURE — 99223 1ST HOSP IP/OBS HIGH 75: CPT | Mod: 25,,, | Performed by: INTERNAL MEDICINE

## 2025-04-27 PROCEDURE — 84484 ASSAY OF TROPONIN QUANT: CPT | Mod: 91 | Performed by: INTERNAL MEDICINE

## 2025-04-27 PROCEDURE — 21400001 HC TELEMETRY ROOM

## 2025-04-27 PROCEDURE — 25000003 PHARM REV CODE 250: Performed by: STUDENT IN AN ORGANIZED HEALTH CARE EDUCATION/TRAINING PROGRAM

## 2025-04-27 PROCEDURE — 63600175 PHARM REV CODE 636 W HCPCS: Performed by: INTERNAL MEDICINE

## 2025-04-27 RX ORDER — AMOXICILLIN 250 MG
1 CAPSULE ORAL 2 TIMES DAILY
Status: DISCONTINUED | OUTPATIENT
Start: 2025-04-27 | End: 2025-04-28 | Stop reason: HOSPADM

## 2025-04-27 RX ORDER — PROCHLORPERAZINE EDISYLATE 5 MG/ML
5 INJECTION INTRAMUSCULAR; INTRAVENOUS EVERY 6 HOURS PRN
Status: DISCONTINUED | OUTPATIENT
Start: 2025-04-27 | End: 2025-04-28 | Stop reason: HOSPADM

## 2025-04-27 RX ORDER — ENOXAPARIN SODIUM 150 MG/ML
1 INJECTION SUBCUTANEOUS
Status: DISCONTINUED | OUTPATIENT
Start: 2025-04-27 | End: 2025-04-27

## 2025-04-27 RX ORDER — GLUCAGON 1 MG
1 KIT INJECTION
Status: DISCONTINUED | OUTPATIENT
Start: 2025-04-27 | End: 2025-04-28 | Stop reason: HOSPADM

## 2025-04-27 RX ORDER — NAPROXEN SODIUM 220 MG/1
81 TABLET, FILM COATED ORAL DAILY
Status: DISCONTINUED | OUTPATIENT
Start: 2025-04-27 | End: 2025-04-28 | Stop reason: HOSPADM

## 2025-04-27 RX ORDER — LISINOPRIL 20 MG/1
40 TABLET ORAL DAILY
Status: DISCONTINUED | OUTPATIENT
Start: 2025-04-27 | End: 2025-04-28 | Stop reason: HOSPADM

## 2025-04-27 RX ORDER — POLYETHYLENE GLYCOL 3350 17 G/17G
17 POWDER, FOR SOLUTION ORAL DAILY PRN
Status: DISCONTINUED | OUTPATIENT
Start: 2025-04-27 | End: 2025-04-28 | Stop reason: HOSPADM

## 2025-04-27 RX ORDER — HYDRALAZINE HYDROCHLORIDE 20 MG/ML
10 INJECTION INTRAMUSCULAR; INTRAVENOUS EVERY 6 HOURS PRN
Status: DISCONTINUED | OUTPATIENT
Start: 2025-04-27 | End: 2025-04-28 | Stop reason: HOSPADM

## 2025-04-27 RX ORDER — HEPARIN SODIUM 5000 [USP'U]/ML
5000 INJECTION, SOLUTION INTRAVENOUS; SUBCUTANEOUS EVERY 8 HOURS
Status: DISCONTINUED | OUTPATIENT
Start: 2025-04-27 | End: 2025-04-28 | Stop reason: HOSPADM

## 2025-04-27 RX ORDER — ONDANSETRON HYDROCHLORIDE 2 MG/ML
4 INJECTION, SOLUTION INTRAVENOUS EVERY 12 HOURS PRN
Status: DISCONTINUED | OUTPATIENT
Start: 2025-04-27 | End: 2025-04-28 | Stop reason: HOSPADM

## 2025-04-27 RX ORDER — METOPROLOL SUCCINATE 25 MG/1
25 TABLET, EXTENDED RELEASE ORAL DAILY
Status: DISCONTINUED | OUTPATIENT
Start: 2025-04-27 | End: 2025-04-27

## 2025-04-27 RX ORDER — METOPROLOL SUCCINATE 50 MG/1
50 TABLET, EXTENDED RELEASE ORAL DAILY
Status: DISCONTINUED | OUTPATIENT
Start: 2025-04-28 | End: 2025-04-28 | Stop reason: HOSPADM

## 2025-04-27 RX ORDER — INSULIN ASPART 100 [IU]/ML
0-5 INJECTION, SOLUTION INTRAVENOUS; SUBCUTANEOUS EVERY 6 HOURS PRN
Status: DISCONTINUED | OUTPATIENT
Start: 2025-04-27 | End: 2025-04-28 | Stop reason: HOSPADM

## 2025-04-27 RX ORDER — NITROGLYCERIN 0.4 MG/1
0.4 TABLET SUBLINGUAL EVERY 5 MIN PRN
Status: DISCONTINUED | OUTPATIENT
Start: 2025-04-27 | End: 2025-04-28 | Stop reason: HOSPADM

## 2025-04-27 RX ORDER — ATORVASTATIN CALCIUM 40 MG/1
40 TABLET, FILM COATED ORAL DAILY
Status: DISCONTINUED | OUTPATIENT
Start: 2025-04-27 | End: 2025-04-28 | Stop reason: HOSPADM

## 2025-04-27 RX ORDER — TALC
6 POWDER (GRAM) TOPICAL NIGHTLY PRN
Status: DISCONTINUED | OUTPATIENT
Start: 2025-04-27 | End: 2025-04-28 | Stop reason: HOSPADM

## 2025-04-27 RX ORDER — ACETAMINOPHEN 325 MG/1
650 TABLET ORAL EVERY 6 HOURS PRN
Status: DISCONTINUED | OUTPATIENT
Start: 2025-04-27 | End: 2025-04-28 | Stop reason: HOSPADM

## 2025-04-27 RX ADMIN — SENNOSIDES AND DOCUSATE SODIUM 1 TABLET: 50; 8.6 TABLET ORAL at 08:04

## 2025-04-27 RX ADMIN — HEPARIN SODIUM 5000 UNITS: 5000 INJECTION INTRAVENOUS; SUBCUTANEOUS at 08:04

## 2025-04-27 RX ADMIN — ASPIRIN 81 MG CHEWABLE TABLET 81 MG: 81 TABLET CHEWABLE at 08:04

## 2025-04-27 RX ADMIN — ATORVASTATIN CALCIUM 40 MG: 40 TABLET, FILM COATED ORAL at 08:04

## 2025-04-27 RX ADMIN — HEPARIN SODIUM 5000 UNITS: 5000 INJECTION INTRAVENOUS; SUBCUTANEOUS at 05:04

## 2025-04-27 RX ADMIN — LISINOPRIL 40 MG: 20 TABLET ORAL at 08:04

## 2025-04-27 RX ADMIN — METOPROLOL SUCCINATE 25 MG: 25 TABLET, EXTENDED RELEASE ORAL at 08:04

## 2025-04-27 NOTE — ASSESSMENT & PLAN NOTE
Patient's blood pressure range in the last 24 hours was: BP  Min: 140/85  Max: 174/109.The patient's inpatient anti-hypertensive regimen is listed below:  Current Antihypertensives  , Daily, Oral  nitroGLYCERIN SL tablet 0.4 mg, Every 5 min PRN, Sublingual  lisinopriL tablet 40 mg, Daily, Oral  hydrALAZINE injection 10 mg, Every 6 hours PRN, Intravenous  metoprolol succinate (TOPROL-XL) 24 hr tablet 25 mg, Daily, Oral    Plan  - BP is uncontrolled, will adjust as follows:  We will increase metoprolol  -
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
## 2025-04-27 NOTE — ASSESSMENT & PLAN NOTE
Patient's blood pressure range in the last 24 hours was: BP  Min: 140/85  Max: 174/91.The patient's inpatient anti-hypertensive regimen is listed below:  Current Antihypertensives  , Daily, Oral  nitroGLYCERIN SL tablet 0.4 mg, Every 5 min PRN, Sublingual  lisinopriL tablet 40 mg, Daily, Oral  hydrALAZINE injection 10 mg, Every 6 hours PRN, Intravenous    Plan  - BP is uncontrolled, will adjust as follows: resume home Lisinopril, add IV Hydralazine PRN   - Monitor BP

## 2025-04-27 NOTE — CARE UPDATE
49-year-old  male presented with complaints of left-sided chest pain that was present with inspiration, at rest, described as sharp, nonradiating, no other exacerbating factors including exertion.  No heavy lifting recently for cough, recent illnesses, fevers, chills, shortness of breath.  States it is still present, present every time he takes a deep breath.  Currently still with intermittent chest pain.    Troponins are elevated.  Appears to have some chronic elevation however slightly increased from prior.  BNP normal    NSTEMI:  Patient is hypertensive which could be contributing factor causing some demand ischemia. Added metoprolol for better blood pressure control.  Resume ACEi.  ASA, statin.  Cardiology consulted who plans for stress test in a.m.

## 2025-04-27 NOTE — SUBJECTIVE & OBJECTIVE
"Past Medical History:   Diagnosis Date    CAD (coronary artery disease)     Chronic wound     Diabetes mellitus     Diabetic peripheral angiopathy     Essential (primary) hypertension     Hypertension     Lymphedema     Traumatic brain injury        Past Surgical History:   Procedure Laterality Date    ABDOMINAL SURGERY      DEBRIDEMENT         Review of patient's allergies indicates:   Allergen Reactions    Dilantin infatabs [phenytoin]        No current facility-administered medications on file prior to encounter.     Current Outpatient Medications on File Prior to Encounter   Medication Sig    JANUVIA 50 mg Tab Take 50 mg by mouth once daily.    MOUNJARO 2.5 mg/0.5 mL PnIj Inject 2.5 mg into the skin once a week.    simvastatin (ZOCOR) 20 MG tablet Take 20 mg by mouth every evening.    benazepriL (LOTENSIN) 40 MG tablet Take 40 mg by mouth once daily.    furosemide (LASIX) 40 MG tablet Take 40 mg by mouth once daily.    meloxicam (MOBIC) 7.5 MG tablet TAKE ONE TABLET BY MOUTH EVERY 12 HOURS WITH FOOD AVOID OTHER NSAIDS WHILE TAKING THIS MED    metFORMIN (GLUCOPHAGE-XR) 500 MG ER 24hr tablet Take 500 mg by mouth 2 (two) times daily.    potassium chloride (KLOR-CON) 10 MEQ TbSR TAKE ONE TABLET POBID FOR 4 DAYS WITH FUROSEMIDE    potassium chloride SA (K-DUR,KLOR-CON) 20 MEQ tablet Take 20 mEq by mouth once daily.    [DISCONTINUED] amlodipine-benazepril (LOTREL) 10-40 mg per capsule Take 1 capsule by mouth once daily.    [DISCONTINUED] bumetanide (BUMEX) 1 MG tablet Take 1 mg by mouth.    [DISCONTINUED] hydroCHLOROthiazide (HYDRODIURIL) 25 MG tablet Take 25 mg by mouth once daily.     Family History    None       Tobacco Use    Smoking status: Never    Smokeless tobacco: Never   Substance and Sexual Activity    Alcohol use: Yes     Comment: "2 or 3 beers" daily     Drug use: Yes     Types: Marijuana     Comment: occassionally     Sexual activity: Not Currently     Review of Systems   Cardiovascular:  Positive for " "chest pain. Negative for dyspnea on exertion, palpitations and syncope.   Neurological:  Negative for focal weakness.     Objective:     Vital Signs (Most Recent):  Temp: 98 °F (36.7 °C) (04/27/25 0740)  Pulse: 84 (04/27/25 0900)  Resp: 16 (04/27/25 0740)  BP: (!) 158/83 (04/27/25 0900)  SpO2: (!) 93 % (04/27/25 0740) Vital Signs (24h Range):  Temp:  [97.8 °F (36.6 °C)-98.4 °F (36.9 °C)] 98 °F (36.7 °C)  Pulse:  [] 84  Resp:  [16-23] 16  SpO2:  [93 %-100 %] 93 %  BP: (140-174)/() 158/83     Weight: (!) 145.2 kg (320 lb 1.7 oz)  Body mass index is 53.27 kg/m².    SpO2: (!) 93 %         Intake/Output Summary (Last 24 hours) at 4/27/2025 1051  Last data filed at 4/27/2025 0657  Gross per 24 hour   Intake --   Output 425 ml   Net -425 ml       Lines/Drains/Airways       Peripheral Intravenous Line  Duration                  Peripheral IV - Single Lumen 04/26/25 1646 22 G Right Forearm <1 day                     Physical Exam  Vitals reviewed.   Constitutional:       Appearance: He is well-developed.   Neck:      Vascular: No carotid bruit.   Cardiovascular:      Rate and Rhythm: Normal rate and regular rhythm.      Pulses: Intact distal pulses.      Heart sounds: Normal heart sounds. No murmur heard.  Pulmonary:      Breath sounds: Normal breath sounds.   Neurological:      Mental Status: He is oriented to person, place, and time.          Significant Labs: Troponin No results for input(s): "TROPONINIHS" in the last 48 hours., All pertinent lab results from the last 24 hours have been reviewed., and   Recent Lab Results  (Last 5 results in the past 24 hours)        04/27/25  0733   04/27/25  0523   04/27/25  0420   04/27/25  0213   04/27/25  0210        ABO and RH         O POS       Anion Gap       6         Baso #       0.03         Basophil %       0.5         BUN       10         Calcium       8.5         Chloride       106         CO2       24         Creatinine       1.0         eGFR       " >60  Comment: Estimated GFR calculated using the CKD-EPI creatinine (2021) equation.         Eos #       0.15         Eos %       2.3         Glucose       96         Gran # (ANC)       4.33         Group & Rh       O POS         Hematocrit       37.4         Hemoglobin       11.3         Immature Grans (Abs)       0.01  Comment: Mild elevation in immature granulocytes is non specific and can be seen in a variety of conditions including stress response, acute inflammation, trauma and pregnancy. Correlation with other laboratory and clinical findings is essential.         Immature Granulocytes       0.2         INDIRECT REBECCA       NEG         Lymph #       1.59         Lymph %       24.3         MCH       22.7         MCHC       30.2         MCV       75         Mono #       0.42         Mono %       6.4         MPV       10.3         Neut %       66.3         nRBC       0         Platelet Count       217         POCT Glucose   106             Potassium       3.9         RBC       4.98         RDW       14.0         Sodium       136         Specimen Outdate       04/30/2025 23:59         Troponin I 0.234  Comment: The reference interval for Troponin I represents the 99th percentile cutoff for our facility and is consistent with 3rd generation assay performance.     0.221  Comment: The reference interval for Troponin I represents the 99th percentile cutoff for our facility and is consistent with 3rd generation assay performance.   0.245  Comment: The reference interval for Troponin I represents the 99th percentile cutoff for our facility and is consistent with 3rd generation assay performance.         WBC       6.53                                Significant Imaging: Echocardiogram: Transthoracic echo (TTE) complete (Cupid Only):   Results for orders placed or performed during the hospital encounter of 04/26/25   Echo   Result Value Ref Range    BSA 2.58 m2    LVOT stroke volume 83.2 cm3    LVIDd 5.4 3.5 - 6.0 cm     LV Systolic Volume 69 mL    LV Systolic Volume Index 28.5 mL/m2    LVIDs 4.0 2.1 - 4.0 cm    LV ESV A2C 87.73 mL    LV Diastolic Volume 144 mL    LV ESV A4C 56.49 mL    LV Diastolic Volume Index 59.50 mL/m2    Left Ventricular End Systolic Volume by Teichholz Method 68.88 mL    Left Ventricular End Diastolic Volume by Teichholz Method 143.81 mL    IVS 0.9 0.6 - 1.1 cm    LVOT diameter 2.2 cm    LVOT area 3.8 cm2    FS 25.9 (A) 28 - 44 %    Left Ventricle Relative Wall Thickness 0.30 cm    PW 0.8 0.6 - 1.1 cm    LV mass 167.4 g    LV Mass Index 69.2 g/m2    MV Peak E Ramon 0.79 m/s    TDI LATERAL 0.10 m/s    TDI SEPTAL 0.08 m/s    E/E' ratio 9 m/s    MV Peak A Ramon 0.80 m/s    TR Max Ramon 2.3 m/s    E/A ratio 0.99     IVRT 114 msec    E wave deceleration time 246 msec    LV SEPTAL E/E' RATIO 9.9 m/s    LV LATERAL E/E' RATIO 7.9 m/s    LVOT peak ramon 1.0 m/s    Left Ventricular Outflow Tract Mean Velocity 0.67 cm/s    Left Ventricular Outflow Tract Mean Gradient 1.99 mmHg    RV- campuzano basal diam 3.0 cm    RV S' 9.67 cm/s    RVOT peak VTI 11.4 cm    TAPSE 2.7 cm    RV/LV Ratio 0.56 cm    LA size 3.4 cm    Left Atrium Minor Axis 5.9 cm    Left Atrium Major Axis 6.1 cm    LA Vol (MOD) 71 mL    JIMY (MOD) 29 mL/m2    RA Major Axis 4.93 cm    AV mean gradient 4 mmHg    AV peak gradient 6 mmHg    Ao peak ramon 1.2 m/s    Ao VTI 23.3 cm    LVOT peak VTI 21.9 cm    AV valve area 3.6 cm²    AV Velocity Ratio 0.83     AV index (prosthetic) 0.94     GERALDO by Velocity Ratio 3.2 cm²    MV stenosis pressure 1/2 time 71.45 ms    MV valve area p 1/2 method 3.08 cm2    Triscuspid Valve Regurgitation Peak Gradient 20 mmHg    PV mean gradient 1 mmHg    PV PEAK VELOCITY 0.86 m/s    PV peak gradient 2 mmHg    Pulmonary Valve Mean Velocity 0.63 m/s    RVOT peak ramon 0.64 m/s    Ao root annulus 3.1 cm    STJ 2.3 cm    Ascending aorta 2.4 cm    Mean e' 0.09 m/s    ZLVIDS -4.24     ZLVIDD -7.51     LA area A4C 20.53 cm2    LA area A2C 25.20 cm2    RVDD  3.00 cm    JIMY 30 mL/m2    LA Vol 73 cm3    LA WIDTH 4.2 cm    RA Width 3.8 cm

## 2025-04-27 NOTE — ASSESSMENT & PLAN NOTE
- Cardiology (Dr. Aldrich) was contacted by ED and recommended initiation of tx dose lovenox x 1 dose while trending trops   - Received 1mg/kg dose of lovenox x 1 and aspirin 325 in ED on 04/26  - CP resolved at this time   - followup cardiology recs   - Continue aspirin, start high intensity statin   - trend trops   - tele monitoring  - obtain echo   - NPO pending cardiology eval

## 2025-04-27 NOTE — ASSESSMENT & PLAN NOTE
Atypical.    Troponin elevation chronic little higher this time on prior admissions.    History of LVH and hypertension likely demand from hypertension and LVH.    Aspirin.    Troponin peak at 0.27.    DC Lovenox.    NPO after midnight for nuclear stress test in the a.m..    Pending echocardiogram  May eat today
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
## 2025-04-27 NOTE — ASSESSMENT & PLAN NOTE
"Patient's FSGs are controlled on current medication regimen.  Last A1c reviewed-   Lab Results   Component Value Date    HGBA1C 7.4 (H) 11/22/2024     Most recent fingerstick glucose reviewed- No results for input(s): "POCTGLUCOSE" in the last 24 hours.  Current correctional scale  Low  Maintain anti-hyperglycemic dose as follows-   Antihyperglycemics (From admission, onward)      Start     Stop Route Frequency Ordered    04/27/25 0305  insulin aspart U-100 pen 0-5 Units         -- SubQ Every 6 hours PRN 04/27/25 0205        Obtain A1c, lipid panel   Monitor BG;hypoglycemia protocol   Hold Oral hypoglycemics while patient is in the hospital.      "

## 2025-04-27 NOTE — PLAN OF CARE
POC reviewed with pt. Pt verbalizes understanding of POC. No questions at this time.  AAOx4, some intermittent confusion. Delayed responses at times. NADN.  NSR on cardiac monitor.  Pt remains free of falls. Able to turn/weight shift independently.   POCT glucose monitored. Diabetes educator consulted for elevated A1C and further education.  Plan to be NPO @ [facility] for stress test in a.m.  Wound care completed and picture placed in chart. Wound care consulted.   No complaints at this time.  Safety measures in place. Will continue to monitor.  Informed pt to call for assistance before getting up. Pt verbalizes understanding.  Hourly rounding and chart check complete.
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
## 2025-04-27 NOTE — ASSESSMENT & PLAN NOTE
Body mass index is 53.27 kg/m². Morbid obesity complicates all aspects of disease management from diagnostic modalities to treatment. Weight loss encouraged and health benefits explained to patient.

## 2025-04-27 NOTE — CONSULTS
RD providing remote coverage.   Consulted for NSTEMI - cardiac diet education   - attached clinical reference materials handouts to discharge documents.    On site RD to provide in-person education as warranted,   education materials to be provided with discharge instructions.      Please re-consult as needed.  Thank you.   Chiquita Rod, RDN, LDN

## 2025-04-27 NOTE — SUBJECTIVE & OBJECTIVE
"Past Medical History:   Diagnosis Date    CAD (coronary artery disease)     Chronic wound     Diabetes mellitus     Diabetic peripheral angiopathy     Essential (primary) hypertension     Hypertension     Lymphedema     Traumatic brain injury        Past Surgical History:   Procedure Laterality Date    ABDOMINAL SURGERY      DEBRIDEMENT         Review of patient's allergies indicates:   Allergen Reactions    Dilantin infatabs [phenytoin]        No current facility-administered medications on file prior to encounter.     Current Outpatient Medications on File Prior to Encounter   Medication Sig    JANUVIA 50 mg Tab Take 50 mg by mouth once daily.    MOUNJARO 2.5 mg/0.5 mL PnIj Inject 2.5 mg into the skin once a week.    simvastatin (ZOCOR) 20 MG tablet Take 20 mg by mouth every evening.    benazepriL (LOTENSIN) 40 MG tablet Take 40 mg by mouth once daily.    furosemide (LASIX) 40 MG tablet Take 40 mg by mouth once daily.    meloxicam (MOBIC) 7.5 MG tablet TAKE ONE TABLET BY MOUTH EVERY 12 HOURS WITH FOOD AVOID OTHER NSAIDS WHILE TAKING THIS MED    metFORMIN (GLUCOPHAGE-XR) 500 MG ER 24hr tablet Take 500 mg by mouth 2 (two) times daily.    potassium chloride (KLOR-CON) 10 MEQ TbSR TAKE ONE TABLET POBID FOR 4 DAYS WITH FUROSEMIDE    potassium chloride SA (K-DUR,KLOR-CON) 20 MEQ tablet Take 20 mEq by mouth once daily.    [DISCONTINUED] amlodipine-benazepril (LOTREL) 10-40 mg per capsule Take 1 capsule by mouth once daily.    [DISCONTINUED] bumetanide (BUMEX) 1 MG tablet Take 1 mg by mouth.    [DISCONTINUED] hydroCHLOROthiazide (HYDRODIURIL) 25 MG tablet Take 25 mg by mouth once daily.     Family History    None       Tobacco Use    Smoking status: Never    Smokeless tobacco: Never   Substance and Sexual Activity    Alcohol use: Yes     Comment: "2 or 3 beers" daily     Drug use: Yes     Types: Marijuana     Comment: occassionally     Sexual activity: Not Currently     Review of Systems   Constitutional:  Negative for " activity change, appetite change, chills and fever.   HENT:  Negative for congestion.    Respiratory:  Positive for shortness of breath. Negative for cough and wheezing.    Cardiovascular:  Positive for chest pain and leg swelling. Negative for palpitations.   Gastrointestinal:  Positive for constipation. Negative for abdominal pain, diarrhea, nausea and vomiting.   Genitourinary:  Negative for difficulty urinating, dysuria and hematuria.   Musculoskeletal:  Negative for arthralgias.   Skin:  Positive for wound (LLE).   Neurological:  Negative for dizziness, light-headedness and headaches.     Objective:     Vital Signs (Most Recent):  Temp: 97.8 °F (36.6 °C) (04/27/25 0159)  Pulse: 89 (04/27/25 0159)  Resp: 20 (04/27/25 0159)  BP: (!) 174/91 (04/27/25 0159)  SpO2: (!) 94 % (04/27/25 0159) Vital Signs (24h Range):  Temp:  [97.8 °F (36.6 °C)-98.4 °F (36.9 °C)] 97.8 °F (36.6 °C)  Pulse:  [] 89  Resp:  [18-23] 20  SpO2:  [94 %-100 %] 94 %  BP: (140-174)/(68-98) 174/91     Weight: (!) 145.2 kg (320 lb 1.7 oz)  Body mass index is 53.27 kg/m².     Physical Exam  Vitals and nursing note reviewed.   Constitutional:       General: He is not in acute distress.     Appearance: He is obese. He is not ill-appearing.   HENT:      Head: Normocephalic and atraumatic.   Eyes:      General: No scleral icterus.     Extraocular Movements: Extraocular movements intact.      Conjunctiva/sclera: Conjunctivae normal.   Cardiovascular:      Rate and Rhythm: Normal rate and regular rhythm.      Heart sounds: No murmur heard.  Pulmonary:      Effort: Pulmonary effort is normal.      Breath sounds: Normal breath sounds. No wheezing, rhonchi or rales.   Abdominal:      General: Bowel sounds are normal. There is no distension.      Palpations: Abdomen is soft.      Tenderness: There is no abdominal tenderness.      Comments: Obese    Genitourinary:     Comments: deferred  Musculoskeletal:      Right lower leg: Edema present.      Left  lower leg: Edema present.      Comments: 2+ pitting edema to BLE, chronic deformity of LUE    Skin:     Comments: Shallow wound to LLE (See image in chart)    Neurological:      Mental Status: He is alert and oriented to person, place, and time.                Significant Labs: All pertinent labs within the past 24 hours have been reviewed.    Significant Imaging: I have reviewed all pertinent imaging results/findings within the past 24 hours.
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
## 2025-04-28 VITALS
OXYGEN SATURATION: 95 % | HEART RATE: 94 BPM | WEIGHT: 315 LBS | RESPIRATION RATE: 18 BRPM | SYSTOLIC BLOOD PRESSURE: 191 MMHG | TEMPERATURE: 99 F | HEIGHT: 65 IN | DIASTOLIC BLOOD PRESSURE: 82 MMHG | BODY MASS INDEX: 52.48 KG/M2

## 2025-04-28 PROBLEM — R07.9 CHEST PAIN: Status: RESOLVED | Noted: 2025-04-27 | Resolved: 2025-04-28

## 2025-04-28 PROBLEM — I21.4 NSTEMI (NON-ST ELEVATED MYOCARDIAL INFARCTION): Status: RESOLVED | Noted: 2025-04-27 | Resolved: 2025-04-28

## 2025-04-28 LAB
CV STRESS BASE HR: 109 BPM
DIASTOLIC BLOOD PRESSURE: 112 MMHG
EJECTION FRACTION- HIGH: 73 %
END DIASTOLIC INDEX-HIGH: 165 ML/M2
END DIASTOLIC INDEX-LOW: 101 ML/M2
END SYSTOLIC INDEX-HIGH: 64 ML/M2
END SYSTOLIC INDEX-LOW: 28 ML/M2
NUC REST EJECTION FRACTION: 41
NUC STRESS EJECTION FRACTION: 49 %
OHS CV CPX 85 PERCENT MAX PREDICTED HEART RATE MALE: 146
OHS CV CPX MAX PREDICTED HEART RATE: 172
OHS CV CPX PATIENT IS FEMALE: 0
OHS CV CPX PATIENT IS MALE: 1
OHS CV CPX PEAK DIASTOLIC BLOOD PRESSURE: 97 MMHG
OHS CV CPX PEAK HEAR RATE: 71 BPM
OHS CV CPX PEAK RATE PRESSURE PRODUCT: NORMAL
OHS CV CPX PEAK SYSTOLIC BLOOD PRESSURE: 153 MMHG
OHS CV CPX PERCENT MAX PREDICTED HEART RATE ACHIEVED: 41
OHS CV CPX RATE PRESSURE PRODUCT PRESENTING: NORMAL
OHS CV INITIAL DOSE: 10.1 MCG/KG/MIN
OHS CV PEAK DOSE: 33.4 MCG/KG/MIN
POCT GLUCOSE: 100 MG/DL (ref 70–110)
POCT GLUCOSE: 103 MG/DL (ref 70–110)
POCT GLUCOSE: 112 MG/DL (ref 70–110)
RETIRED EF AND QEF - SEE NOTES: 59 %
SYSTOLIC BLOOD PRESSURE: 163 MMHG

## 2025-04-28 PROCEDURE — G0378 HOSPITAL OBSERVATION PER HR: HCPCS

## 2025-04-28 PROCEDURE — 99233 SBSQ HOSP IP/OBS HIGH 50: CPT | Mod: 25,,, | Performed by: INTERNAL MEDICINE

## 2025-04-28 PROCEDURE — A9502 TC99M TETROFOSMIN: HCPCS | Performed by: HOSPITALIST

## 2025-04-28 PROCEDURE — 63600175 PHARM REV CODE 636 W HCPCS: Performed by: INTERNAL MEDICINE

## 2025-04-28 PROCEDURE — 25000003 PHARM REV CODE 250: Performed by: INTERNAL MEDICINE

## 2025-04-28 PROCEDURE — 63600175 PHARM REV CODE 636 W HCPCS: Performed by: HOSPITALIST

## 2025-04-28 PROCEDURE — 96372 THER/PROPH/DIAG INJ SC/IM: CPT | Performed by: INTERNAL MEDICINE

## 2025-04-28 RX ORDER — REGADENOSON 0.08 MG/ML
0.4 INJECTION, SOLUTION INTRAVENOUS ONCE
Status: COMPLETED | OUTPATIENT
Start: 2025-04-28 | End: 2025-04-28

## 2025-04-28 RX ORDER — AMLODIPINE BESYLATE 5 MG/1
5 TABLET ORAL DAILY
Status: DISCONTINUED | OUTPATIENT
Start: 2025-04-28 | End: 2025-04-28 | Stop reason: HOSPADM

## 2025-04-28 RX ORDER — NITROGLYCERIN 0.4 MG/1
0.4 TABLET SUBLINGUAL EVERY 5 MIN PRN
Qty: 25 TABLET | Refills: 4 | Status: SHIPPED | OUTPATIENT
Start: 2025-04-29 | End: 2026-04-29

## 2025-04-28 RX ORDER — METOPROLOL SUCCINATE 50 MG/1
50 TABLET, EXTENDED RELEASE ORAL DAILY
Qty: 90 TABLET | Refills: 3 | Status: SHIPPED | OUTPATIENT
Start: 2025-04-29 | End: 2026-04-29

## 2025-04-28 RX ORDER — NAPROXEN SODIUM 220 MG/1
81 TABLET, FILM COATED ORAL DAILY
Qty: 30 TABLET | Refills: 11 | Status: SHIPPED | OUTPATIENT
Start: 2025-04-29 | End: 2026-04-29

## 2025-04-28 RX ORDER — AMLODIPINE BESYLATE 5 MG/1
5 TABLET ORAL DAILY
Qty: 90 TABLET | Refills: 3 | Status: SHIPPED | OUTPATIENT
Start: 2025-04-29 | End: 2026-04-29

## 2025-04-28 RX ORDER — ATORVASTATIN CALCIUM 40 MG/1
40 TABLET, FILM COATED ORAL DAILY
Qty: 90 TABLET | Refills: 3 | Status: SHIPPED | OUTPATIENT
Start: 2025-04-29 | End: 2026-04-29

## 2025-04-28 RX ADMIN — HEPARIN SODIUM 5000 UNITS: 5000 INJECTION INTRAVENOUS; SUBCUTANEOUS at 06:04

## 2025-04-28 RX ADMIN — METOPROLOL SUCCINATE 50 MG: 50 TABLET, EXTENDED RELEASE ORAL at 08:04

## 2025-04-28 RX ADMIN — AMLODIPINE BESYLATE 5 MG: 5 TABLET ORAL at 01:04

## 2025-04-28 RX ADMIN — TETROFOSMIN 33.4 MILLICURIE: 1.38 INJECTION, POWDER, LYOPHILIZED, FOR SOLUTION INTRAVENOUS at 12:04

## 2025-04-28 RX ADMIN — ASPIRIN 81 MG CHEWABLE TABLET 81 MG: 81 TABLET CHEWABLE at 08:04

## 2025-04-28 RX ADMIN — REGADENOSON 0.4 MG: 0.08 INJECTION, SOLUTION INTRAVENOUS at 12:04

## 2025-04-28 RX ADMIN — ATORVASTATIN CALCIUM 40 MG: 40 TABLET, FILM COATED ORAL at 08:04

## 2025-04-28 RX ADMIN — HEPARIN SODIUM 5000 UNITS: 5000 INJECTION INTRAVENOUS; SUBCUTANEOUS at 01:04

## 2025-04-28 RX ADMIN — LISINOPRIL 40 MG: 20 TABLET ORAL at 08:04

## 2025-04-28 RX ADMIN — SENNOSIDES AND DOCUSATE SODIUM 1 TABLET: 50; 8.6 TABLET ORAL at 08:04

## 2025-04-28 RX ADMIN — TETROFOSMIN 10.1 MILLICURIE: 1.38 INJECTION, POWDER, LYOPHILIZED, FOR SOLUTION INTRAVENOUS at 10:04
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
## 2025-04-28 NOTE — CONSULTS
Diabetes Education Consult    Consult received. The medical records were reviewed.    Current admission diagnosis: chest pain        Labs:    Hemoglobin A1c   Date Value Ref Range Status   04/27/2025 6.8 (H) 4.0 - 5.6 % Final     Comment:     ADA Screening Guidelines:  5.7-6.4%  Consistent with prediabetes  >=6.5%  Consistent with diabetes    High levels of fetal hemoglobin interfere with the HbA1C  assay. Heterozygous hemoglobin variants (HbS, HgC, etc)do  not significantly interfere with this assay.   However, presence of multiple variants may affect accuracy.         Glucose   Date Value Ref Range Status   04/27/2025 96 70 - 110 mg/dL Final         Met with pt for diabetes management practices. Reviewed current A1C and target. Reviewed disease process and medical complications associated with uncontrolled diabetes. Encouraged lifestyle management to include weight loss, increased activity, and appropriate diet as a part of diabetes management.    Current home medications include: Januvia, and Mounjaro. Pt has been on Mounjaro for almost one month. Reviewed medications, how they work and possible S/E.     Pt does not have glucometer at home. Unable to provide glucometer training due to pt receiving stress test. Will attempt this afternoon. Provided log with recommended testing times and expected results. Discussed S/S of hypoglycemia. Reviewed appropriate treatment options. Reviewed S/S of hyperglycemia.     Discussed carbohydrate containing foods, 30-60 grams carbohydrate choice meal plan using pictures; serving sizes, Plate Method, label reading, and Calorie Kalpesh Bora. Recommended lean protein and healthy fat with meals and snacks. Encouraged to avoid obvious sources of sugar.    Reviewed the diabetes care checklist and the importance of regular follow up with physician. Left diabetes education packet for reference and contact information. Encouraged pt to contact diabetes education team with any questions or  concerns.      Please consult as needed.  Thank You!  Mallory Olivares RN  Diabetes Education

## 2025-04-28 NOTE — DISCHARGE INSTRUCTIONS
Our goal at [facility] is to always give you outstanding care and exceptional service. You may receive a survey from [employer] by mail, text or e-mail in the next 24-48 hours asking about the care you received with us. The survey should only take 5-10 minutes to complete and is very important to us.     Your feedback provides us with a way to recognize our staff who work tirelessly to provide the best care! Also, your responses help us learn how to improve when your experience was below our aspiration of excellence. We are always looking for ways to improve your stay. We WILL use your feedback to continue making improvements to help us provide the highest quality care. We keep your personal information and feedback confidential. We appreciate your time completing this survey and can't wait to hear from you!!!    We look forward to your continued care with us! Thanks so much for choosing [facility] for your healthcare needs!

## 2025-04-28 NOTE — PLAN OF CARE
POC reviewed w/ patient. Pt verbalizes understanding of plan  Chart/Orders reviewed  All safety precautions in place; No falls this shift  Pt resting in bed  Pain controlled  Continuous rounding c bed in lowest position, side rails up, call light in reach  Will continue to monitor until the end of shift  Problem: Adult Inpatient Plan of Care  Goal: Plan of Care Review  Outcome: Progressing  Flowsheets (Taken 4/28/2025 0642)  Plan of Care Reviewed With: patient  Goal: Patient-Specific Goal (Individualized)  Outcome: Progressing  Flowsheets (Taken 4/28/2025 0642)  Individualized Care Needs: none  Anxieties, Fears or Concerns: none  Patient/Family-Specific Goals (Include Timeframe): none  Goal: Absence of Hospital-Acquired Illness or Injury  Outcome: Progressing  Goal: Optimal Comfort and Wellbeing  Outcome: Progressing  Goal: Readiness for Transition of Care  Outcome: Progressing
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
## 2025-04-28 NOTE — NURSING
Discharge instructions received pt setup for VN to review with pt and family at bedside.  Tele monitor removed and brought to monitor tech.  IV d/c'd with tip intact, pressure dressing applied.  Pt notified to call  when ride is here to have transport notified to take pt to front of hospital via w/c to be discharged home.

## 2025-04-28 NOTE — ASSESSMENT & PLAN NOTE
Patient's blood pressure range in the last 24 hours was: BP  Min: 138/91  Max: 163/90.The patient's inpatient anti-hypertensive regimen is listed below:  Current Antihypertensives  , Daily, Oral  nitroGLYCERIN SL tablet 0.4 mg, Every 5 min PRN, Sublingual  lisinopriL tablet 40 mg, Daily, Oral  hydrALAZINE injection 10 mg, Every 6 hours PRN, Intravenous  metoprolol succinate (TOPROL-XL) 24 hr tablet 50 mg, Daily, Oral  amLODIPine tablet 5 mg, Daily, Oral  amlodipine (NORVASC) tablet, Daily, Oral  metoprolol (TOPROL-XL) 24 hr tablet, Daily, Oral  nitroGLYCERIN (NITROSTAT) SL tablet, Every 5 min PRN, Sublingual    Plan  - BP is uncontrolled, will adjust as follows: resume home Lisinopril, add IV Hydralazine PRN   - Monitor BP

## 2025-04-28 NOTE — ASSESSMENT & PLAN NOTE
Patient's blood pressure range in the last 24 hours was: BP  Min: 147/73  Max: 163/90.The patient's inpatient anti-hypertensive regimen is listed below:  Current Antihypertensives  , Daily, Oral  nitroGLYCERIN SL tablet 0.4 mg, Every 5 min PRN, Sublingual  lisinopriL tablet 40 mg, Daily, Oral  hydrALAZINE injection 10 mg, Every 6 hours PRN, Intravenous  metoprolol succinate (TOPROL-XL) 24 hr tablet 50 mg, Daily, Oral  amLODIPine tablet 5 mg, Daily, Oral    Plan  - BP is uncontrolled, will adjust as follows:  We will increase metoprolol  -   04/28/2025.    Still uncontrolled.  Will add amlodipine.

## 2025-04-28 NOTE — PROGRESS NOTES
AVS virtually reviewed with Mr Villalba in its entirety with emphasis on diet, medications, follow-up appointments and reasons to return to the ED. Patient also encouraged to utilize their patient portal. Ease and convenience of use reiterated. New cell phone # added to demographics- old # removed. Text sent for portal account activation. Education complete and patient voiced understanding. Metformin removed from med list as pt states he does not take- Januvia replaced this medication. All questions answered. Discharge teaching complete. Team notified that pt needs transportation home.

## 2025-04-28 NOTE — HOSPITAL COURSE
4/28/25  Nuclear stress test with no ischemia  Patient reports no further chest pain, denies other complaints  Started on ASA, statin, BB, amlodipine this admission  F/u with Cardiology in 2-3 weeks  Establish care with PCP for further management  Stable for discharge home

## 2025-04-28 NOTE — PLAN OF CARE
[facility] (Hospital)  Discharge Final Note    Primary Care Provider: No, Primary Doctor    Expected Discharge Date: 4/28/2025    Final Discharge Note (most recent)       Final Note - 04/28/25 1550          Final Note    Assessment Type Final Discharge Note     Anticipated Discharge Disposition Home or Self Care     Hospital Resources/Appts/Education Provided Post-Acute resouces added to AVS        Post-Acute Status    Discharge Delays None known at this time                   Pt to discharge home today.   SW confirmed Non- [facility] PCP; f/u information added to AVS for pt to schedule.     Important Message from Medicare             Contact Info       Nicolette Aldrich MD   Specialty: Interventional Cardiology, Cardiology    [address]  [place] [address]   Phone: [phone]       Next Steps: Schedule an appointment as soon as possible for a visit in 2 week(s)    Instructions: Hospital discharge follow up    Kalpesh Kwon FNP   Specialty: Family Medicine    [address] [facility] [address]   Phone: [phone]       Next Steps: Schedule an appointment as soon as possible for a visit in 1 week(s)    Instructions: Hospital follow up

## 2025-04-28 NOTE — PLAN OF CARE
[facility] (Hospital)  Initial Discharge Assessment       Primary Care Provider: Rocio, Primary Doctor    Admission Diagnosis: NSTEMI (non-ST elevated myocardial infarction) [I21.4]  Chest pain [R07.9]    Admission Date: 4/26/2025  Expected Discharge Date: 4/28/2025         Payor: [employer] BLUE MEDICARE ADVANTAGE / Plan: [employer] DUAL ADVANTAGE / Product Type: Medicare Advantage /     Extended Emergency Contact Information  Primary Emergency Contact: ethan Villalba  Home Phone: [phone]  Mobile Phone: [phone]  Relation: Brother   needed? No  Secondary Emergency Contact: humbertojessica  Mobile Phone: [phone]  Relation: Sister   needed? No    Discharge Plan A: Home         [id number] - [place], [place] - [id number] [facility]  [place] [address]  Phone: [phone] Fax: [phone]      Initial Assessment (most recent)       Adult Discharge Assessment - 04/28/25 1546          Discharge Assessment    Assessment Type Discharge Planning Assessment     Confirmed/corrected address, phone number and insurance Yes     Source of Information patient     Communicated JACQUE with patient/caregiver Date not available/Unable to determine     People in Home alone     Facility Arrived From: Home     Do you expect to return to your current living situation? Yes     Do you have help at home or someone to help you manage your care at home? Yes     Who are your caregiver(s) and their phone number(s)? Brother and caregiver     Prior to hospitilization cognitive status: Alert/Oriented     Current cognitive status: Alert/Oriented     Walking or Climbing Stairs Difficulty yes     Walking or Climbing Stairs ambulation difficulty, requires equipment     Mobility Management cane and wheelchair     Equipment Currently Used at Home cane, straight;wheelchair     Readmission within 30 days? No     Patient currently being followed by outpatient case management? No     Do you  currently have service(s) that help you manage your care at home? --     Do you take prescription medications? Yes     Do you have prescription coverage? Yes     Do you have any problems affording any of your prescribed medications? No     Is the patient taking medications as prescribed? yes     Who is going to help you get home at discharge? Pt requesting assistance     How do you get to doctors appointments? family or friend will provide     Are you on dialysis? No     Do you take coumadin? No     Discharge Plan A Home     DME Needed Upon Discharge  none     Discharge Plan discussed with: Patient                   SW met with pt to complete discharge assessment. Pt reports living alone. Pt reports having a caregiver that assists with ADLs, hygiene assistance and housework.   Pt reports ambulating with cane and wheelchair.   Pt's PCP is Kalpesh Kwon NP based in [place].   Pt requesting transport assistance home. Pt reports having a key to enter his home.     Pt's whiteboard updated with CM contact and anticipated discharge disposition. SW to remain available as needed.
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
## 2025-04-28 NOTE — SUBJECTIVE & OBJECTIVE
Review of Systems   Cardiovascular:  Positive for chest pain. Negative for dyspnea on exertion, palpitations and syncope.   Neurological:  Negative for focal weakness.     Objective:     Vital Signs (Most Recent):  Temp: 97.9 °F (36.6 °C) (04/28/25 0733)  Pulse: 79 (04/28/25 0924)  Resp: 18 (04/28/25 0733)  BP: (!) 159/91 (04/28/25 0733)  SpO2: 95 % (04/28/25 0733) Vital Signs (24h Range):  Temp:  [97.5 °F (36.4 °C)-98.6 °F (37 °C)] 97.9 °F (36.6 °C)  Pulse:  [] 79  Resp:  [18-20] 18  SpO2:  [95 %-100 %] 95 %  BP: (147-163)/() 159/91     Weight: (!) 145.2 kg (320 lb 1.7 oz)  Body mass index is 53.27 kg/m².     SpO2: 95 %         Intake/Output Summary (Last 24 hours) at 4/28/2025 1239  Last data filed at 4/27/2025 1827  Gross per 24 hour   Intake 480 ml   Output --   Net 480 ml       Lines/Drains/Airways       Peripheral Intravenous Line  Duration                  Peripheral IV - Single Lumen 04/26/25 1646 22 G Right Forearm 1 day                       Physical Exam  Vitals reviewed.   Constitutional:       Appearance: He is well-developed.   Neck:      Vascular: No carotid bruit.   Cardiovascular:      Rate and Rhythm: Normal rate and regular rhythm.      Pulses: Intact distal pulses.      Heart sounds: Normal heart sounds. No murmur heard.  Pulmonary:      Breath sounds: Normal breath sounds.   Neurological:      Mental Status: He is oriented to person, place, and time.            Significant Labs: All pertinent lab results from the last 24 hours have been reviewed. and   Recent Lab Results         04/28/25  1118   04/28/25  0514   04/28/25  0125   04/27/25  1638        POCT Glucose 100   112   103   111               Significant Imaging: Echocardiogram: Transthoracic echo (TTE) complete ([facility] Only):   Results for orders placed or performed during the hospital encounter of 04/26/25   Echo   Result Value Ref Range    BSA 2.58 m2    LVOT stroke volume 83.2 cm3    LVIDd 5.4 3.5 - 6.0 cm    LV Systolic  Volume 69 mL    LV Systolic Volume Index 28.5 mL/m2    LVIDs 4.0 2.1 - 4.0 cm    LV ESV A2C 87.73 mL    LV Diastolic Volume 144 mL    LV ESV A4C 56.49 mL    LV Diastolic Volume Index 59.50 mL/m2    Left Ventricular End Systolic Volume by Teichholz Method 68.88 mL    Left Ventricular End Diastolic Volume by Teichholz Method 143.81 mL    IVS 0.9 0.6 - 1.1 cm    LVOT diameter 2.2 cm    LVOT area 3.8 cm2    FS 25.9 (A) 28 - 44 %    Left Ventricle Relative Wall Thickness 0.30 cm    PW 0.8 0.6 - 1.1 cm    LV mass 167.4 g    LV Mass Index 69.2 g/m2    MV Peak E Ramon 0.79 m/s    TDI LATERAL 0.10 m/s    TDI SEPTAL 0.08 m/s    E/E' ratio 9 m/s    MV Peak A Ramon 0.80 m/s    TR Max Ramon 2.3 m/s    E/A ratio 0.99     IVRT 114 msec    E wave deceleration time 246 msec    LV SEPTAL E/E' RATIO 9.9 m/s    LV LATERAL E/E' RATIO 7.9 m/s    LVOT peak ramon 1.0 m/s    Left Ventricular Outflow Tract Mean Velocity 0.67 cm/s    Left Ventricular Outflow Tract Mean Gradient 1.99 mmHg    RV- campuzano basal diam 3.0 cm    RV S' 9.67 cm/s    RVOT peak VTI 11.4 cm    TAPSE 2.7 cm    RV/LV Ratio 0.56 cm    LA size 3.4 cm    Left Atrium Minor Axis 5.9 cm    Left Atrium Major Axis 6.1 cm    LA Vol (MOD) 71 mL    JIMY (MOD) 29 mL/m2    RA Major Axis 4.93 cm    AV mean gradient 4 mmHg    AV peak gradient 6 mmHg    Ao peak ramon 1.2 m/s    Ao VTI 23.3 cm    LVOT peak VTI 21.9 cm    AV valve area 3.6 cm²    AV Velocity Ratio 0.83     AV index (prosthetic) 0.94     GERALDO by Velocity Ratio 3.2 cm²    MV stenosis pressure 1/2 time 71.45 ms    MV valve area p 1/2 method 3.08 cm2    Triscuspid Valve Regurgitation Peak Gradient 20 mmHg    PV mean gradient 1 mmHg    PV PEAK VELOCITY 0.86 m/s    PV peak gradient 3 mmHg    Pulmonary Valve Mean Velocity 0.63 m/s    RVOT peak ramon 0.64 m/s    Ao root annulus 3.1 cm    STJ 2.3 cm    Ascending aorta 2.4 cm    Mean e' 0.09 m/s    ZLVIDS -4.24     ZLVIDD -7.51     LA area A4C 20.53 cm2    LA area A2C 25.20 cm2    RVDD 3.00 cm     JIMY 30 mL/m2    LA Vol 73 cm3    LA WIDTH 4.2 cm    RA Width 3.8 cm    TV resting pulmonary artery pressure 29 mmHg    RV TB RVSP 10 mmHg    Est. RA pres 8 mmHg    Narrative      Left Ventricle: The left ventricle is normal in size. Normal wall   thickness. There is normal systolic function with a visually estimated   ejection fraction of 60 - 65%. There is normal diastolic function.    Right Ventricle: The right ventricle is normal in size. Wall thickness   is normal. Systolic function is normal.    Mitral Valve: There is mild regurgitation.    Pulmonary Artery: The estimated pulmonary artery systolic pressure is   29 mmHg.    IVC/SVC: Intermediate venous pressure at 8 mmHg.
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
## 2025-04-28 NOTE — ASSESSMENT & PLAN NOTE
Patient's FSGs are controlled on current medication regimen.  Last A1c reviewed-   Lab Results   Component Value Date    HGBA1C 6.8 (H) 04/27/2025     Most recent fingerstick glucose reviewed-   Recent Labs   Lab 04/27/25  1638 04/28/25  0125 04/28/25  0514 04/28/25  1118   POCTGLUCOSE 111* 103 112* 100     Current correctional scale  Low  Maintain anti-hyperglycemic dose as follows-   Antihyperglycemics (From admission, onward)      Start     Stop Route Frequency Ordered    04/27/25 0305  insulin aspart U-100 pen 0-5 Units         -- SubQ Every 6 hours PRN 04/27/25 0205        Obtain A1c, lipid panel   Monitor BG;hypoglycemia protocol   Hold Oral hypoglycemics while patient is in the hospital.

## 2025-06-04 ENCOUNTER — HOSPITAL ENCOUNTER (EMERGENCY)
Facility: HOSPITAL | Age: 50
Discharge: HOME | End: 2025-06-04
Attending: EMERGENCY MEDICINE
Payer: COMMERCIAL

## 2025-06-04 VITALS
TEMPERATURE: 99 F | HEART RATE: 88 BPM | RESPIRATION RATE: 20 BRPM | SYSTOLIC BLOOD PRESSURE: 137 MMHG | OXYGEN SATURATION: 98 % | DIASTOLIC BLOOD PRESSURE: 85 MMHG

## 2025-06-04 DIAGNOSIS — M79.89 LEFT LEG SWELLING: Primary | ICD-10-CM

## 2025-06-04 DIAGNOSIS — I89.0 LYMPHEDEMA: ICD-10-CM

## 2025-06-04 DIAGNOSIS — T14.8XXA CHRONIC WOUND: ICD-10-CM

## 2025-06-04 LAB
ABSOLUTE EOSINOPHIL (OHS): 0.16 K/UL
ABSOLUTE MONOCYTE (OHS): 0.38 K/UL (ref 0.3–1)
ABSOLUTE NEUTROPHIL COUNT (OHS): 3.54 K/UL (ref 1.8–7.7)
ALBUMIN SERPL BCP-MCNC: 3.8 G/DL (ref 3.5–5.2)
ALP SERPL-CCNC: 76 UNIT/L (ref 40–150)
ALT SERPL W/O P-5'-P-CCNC: 17 UNIT/L (ref 10–44)
ANION GAP (OHS): 11 MMOL/L (ref 8–16)
AST SERPL-CCNC: 11 UNIT/L (ref 11–45)
BASOPHILS # BLD AUTO: 0.01 K/UL
BASOPHILS NFR BLD AUTO: 0.2 %
BILIRUB SERPL-MCNC: 0.6 MG/DL (ref 0.1–1)
BNP SERPL-MCNC: <10 PG/ML (ref 0–99)
BUN SERPL-MCNC: 9 MG/DL (ref 6–20)
CALCIUM SERPL-MCNC: 8.9 MG/DL (ref 8.7–10.5)
CHLORIDE SERPL-SCNC: 103 MMOL/L (ref 95–110)
CO2 SERPL-SCNC: 25 MMOL/L (ref 23–29)
CREAT SERPL-MCNC: 1 MG/DL (ref 0.5–1.4)
ERYTHROCYTE [DISTWIDTH] IN BLOOD BY AUTOMATED COUNT: 14.6 % (ref 11.5–14.5)
GFR SERPLBLD CREATININE-BSD FMLA CKD-EPI: >60 ML/MIN/1.73/M2
GLUCOSE SERPL-MCNC: 99 MG/DL (ref 70–110)
HCT VFR BLD AUTO: 36.4 % (ref 40–54)
HGB BLD-MCNC: 11.5 GM/DL (ref 14–18)
HOLD SPECIMEN: NORMAL
IMM GRANULOCYTES # BLD AUTO: 0.01 K/UL (ref 0–0.04)
IMM GRANULOCYTES NFR BLD AUTO: 0.2 % (ref 0–0.5)
LACTATE SERPL-SCNC: 1.5 MMOL/L (ref 0.5–2.2)
LYMPHOCYTES # BLD AUTO: 1.08 K/UL (ref 1–4.8)
MCH RBC QN AUTO: 23.1 PG (ref 27–31)
MCHC RBC AUTO-ENTMCNC: 31.6 G/DL (ref 32–36)
MCV RBC AUTO: 73 FL (ref 82–98)
NUCLEATED RBC (/100WBC) (OHS): 0 /100 WBC
PLATELET # BLD AUTO: 212 K/UL (ref 150–450)
PMV BLD AUTO: 10 FL (ref 9.2–12.9)
POTASSIUM SERPL-SCNC: 3.3 MMOL/L (ref 3.5–5.1)
PROCALCITONIN SERPL-MCNC: 0.05 NG/ML
PROT SERPL-MCNC: 8.3 GM/DL (ref 6–8.4)
RBC # BLD AUTO: 4.98 M/UL (ref 4.6–6.2)
RELATIVE EOSINOPHIL (OHS): 3.1 %
RELATIVE LYMPHOCYTE (OHS): 20.8 % (ref 18–48)
RELATIVE MONOCYTE (OHS): 7.3 % (ref 4–15)
RELATIVE NEUTROPHIL (OHS): 68.4 % (ref 38–73)
SODIUM SERPL-SCNC: 139 MMOL/L (ref 136–145)
WBC # BLD AUTO: 5.18 K/UL (ref 3.9–12.7)

## 2025-06-04 PROCEDURE — 87389 HIV-1 AG W/HIV-1&-2 AB AG IA: CPT | Performed by: EMERGENCY MEDICINE

## 2025-06-04 PROCEDURE — 99285 EMERGENCY DEPT VISIT HI MDM: CPT | Mod: 25,ER

## 2025-06-04 PROCEDURE — 84075 ASSAY ALKALINE PHOSPHATASE: CPT | Mod: ER | Performed by: NURSE PRACTITIONER

## 2025-06-04 PROCEDURE — 83880 ASSAY OF NATRIURETIC PEPTIDE: CPT | Mod: ER | Performed by: NURSE PRACTITIONER

## 2025-06-04 PROCEDURE — 87040 BLOOD CULTURE FOR BACTERIA: CPT | Performed by: NURSE PRACTITIONER

## 2025-06-04 PROCEDURE — 84145 PROCALCITONIN (PCT): CPT | Mod: ER | Performed by: NURSE PRACTITIONER

## 2025-06-04 PROCEDURE — 83605 ASSAY OF LACTIC ACID: CPT | Mod: ER | Performed by: NURSE PRACTITIONER

## 2025-06-04 PROCEDURE — 85025 COMPLETE CBC W/AUTO DIFF WBC: CPT | Mod: ER | Performed by: NURSE PRACTITIONER

## 2025-06-04 PROCEDURE — 86803 HEPATITIS C AB TEST: CPT | Performed by: EMERGENCY MEDICINE

## 2025-06-04 NOTE — ED PROVIDER NOTES
Encounter Date: 6/4/2025       History     Chief Complaint   Patient presents with    Leg Swelling     Left leg swelling, pt states his leg always looks this way but home health said they would kick him out of the program if he didn't come to the ER for evaluation. Denies pain.      Patient presents to ER for left leg swelling that is chronic.  He reports associated wound to posterior left lower leg which is also chronic.  States he is currently being managed by home health services for chronic leg swelling and chronic wound of left lower leg.  He arrives via ambulance services stating that his home health nurse instructed him he had come to the ER for evaluation of chronic left leg swelling.  He states swelling is not any worse than his usual swelling.  States he is not sure why he was made to come to the ER.  He denies any concerns at this time.  He denies fever, chills, generalized body aches, joint immobility, joint instability, chest pain, shortness of breath, numbness, weakness, fatigue.    The history is provided by the patient.     Review of patient's allergies indicates:   Allergen Reactions    Dilantin infatabs [phenytoin]      Past Medical History:   Diagnosis Date    CAD (coronary artery disease)     Chronic wound     Diabetes mellitus     Diabetic peripheral angiopathy     Essential (primary) hypertension     Hypertension     Lymphedema     Traumatic brain injury      Past Surgical History:   Procedure Laterality Date    ABDOMINAL SURGERY      DEBRIDEMENT       No family history on file.  Social History[1]  Review of Systems   Constitutional:  Negative for chills, fatigue and fever.   Eyes:  Negative for pain.   Respiratory:  Negative for cough and shortness of breath.    Cardiovascular:  Positive for leg swelling (Chronic). Negative for chest pain.   Gastrointestinal:  Negative for abdominal pain, nausea and vomiting.   Musculoskeletal:  Negative for back pain, myalgias and neck pain.   Skin:  Positive  for wound (Chronic). Negative for rash.   Neurological:  Negative for weakness, numbness and headaches.       Physical Exam     Initial Vitals [06/04/25 1352]   BP Pulse Resp Temp SpO2   137/83 96 20 98.7 °F (37.1 °C) 98 %      MAP       --         Physical Exam    Nursing note and vitals reviewed.  Constitutional: He is not diaphoretic. He is cooperative.  Non-toxic appearance. He does not have a sickly appearance. He does not appear ill. No distress.   HENT:   Head: Normocephalic and atraumatic.   Neck: Neck supple.   Normal range of motion.  Cardiovascular:  Normal rate, regular rhythm and intact distal pulses.           Pulmonary/Chest: Breath sounds normal. No respiratory distress.   Musculoskeletal:         General: Normal range of motion.      Cervical back: Normal range of motion and neck supple.      Right knee: No tenderness.      Left knee: No tenderness.      Right lower leg: No tenderness or bony tenderness. Edema present.      Left lower leg: No tenderness or bony tenderness. Edema (Left > right) present.      Right ankle: Swelling present.      Left ankle: Swelling present.      Right foot: Swelling present. No bony tenderness. Normal pulse.      Left foot: Swelling present. No bony tenderness. Normal pulse.     Neurological: He is alert and oriented to person, place, and time. He has normal strength. No sensory deficit. GCS score is 15. GCS eye subscore is 4. GCS verbal subscore is 5. GCS motor subscore is 6.   Skin: Skin is warm and dry. Capillary refill takes less than 2 seconds.   +chronic appearing wound to posterior left lower leg, no active drainage.  No surrounding erythema.         ED Course   Procedures  Labs Reviewed   COMPREHENSIVE METABOLIC PANEL - Abnormal       Result Value    Sodium 139      Potassium 3.3 (*)     Chloride 103      CO2 25      Glucose 99      BUN 9      Creatinine 1.0      Calcium 8.9      Protein Total 8.3      Albumin 3.8      Bilirubin Total 0.6      ALP 76      AST  11      ALT 17      Anion Gap 11      eGFR >60     CBC WITH DIFFERENTIAL - Abnormal    WBC 5.18      RBC 4.98      HGB 11.5 (*)     HCT 36.4 (*)     MCV 73 (*)     MCH 23.1 (*)     MCHC 31.6 (*)     RDW 14.6 (*)     Platelet Count 212      MPV 10.0      Nucleated RBC 0      Neut % 68.4      Lymph % 20.8      Mono % 7.3      Eos % 3.1      Basophil % 0.2      Imm Grans % 0.2      Neut # 3.54      Lymph # 1.08      Mono # 0.38      Eos # 0.16      Baso # 0.01      Imm Grans # 0.01     LACTIC ACID, PLASMA - Normal    Lactic Acid Level 1.5      Narrative:     Falsely low lactic acid results can be found in samples containing >=13.0 mg/dL total bilirubin and/or >=3.5 mg/dL direct bilirubin.    PROCALCITONIN - Normal    Procalcitonin 0.05     B-TYPE NATRIURETIC PEPTIDE - Normal    BNP <10     CULTURE, BLOOD   CULTURE, BLOOD   HEP C VIRUS HOLD SPECIMEN    Extra Tube Hold for add-ons.     CBC W/ AUTO DIFFERENTIAL    Narrative:     The following orders were created for panel order CBC auto differential.  Procedure                               Abnormality         Status                     ---------                               -----------         ------                     CBC with Differential[[id number]]       Abnormal            Final result                 Please view results for these tests on the individual orders.   HEPATITIS C ANTIBODY   HIV 1 / 2 ANTIBODY          Imaging Results              US Lower Extremity Veins Left (Final result)  Result time 06/04/25 16:05:59      Final result by Mike Lan MD (06/04/25 16:05:59)                   Impression:      No evidence of a left lower extremity deep venous thrombosis.    Finalized on: 6/4/2025 4:05 PM By:  Mike Lan MD  [facility]# [id number]      2025-06-04 16:08:09.288     [facility]               Narrative:      EXAM: US LOWER EXTREMITY VEINS LEFT    CLINICAL HISTORY: Pain in left leg ,left lower extremity swelling.    TECHNIQUE:  Real-time, color, and duplex  evaluation of the deep venous vessels in left lower extremity were performed.    FINDINGS: Comparisons are made to 04/27/2025.   No evidence of deep venous thrombosis in the left lower extremity. The deep venous vessels demonstrate normal spontaneous, augmented, and respirophasicity flow. Deep venous vessels compress in a normal fashion throughout.    Worsening calf edema noted.                                         X-Ray Tibia Fibula 2 View Left (Final result)  Result time 06/04/25 15:16:39      Final result by Bong Del Castillo MD (06/04/25 15:16:39)                   Impression:      As above.      Electronically signed by: Bong Del Castillo  Date:    06/04/2025  Time:    15:16               Narrative:    EXAMINATION:  XR TIBIA FIBULA 2 VIEW LEFT    CLINICAL HISTORY:  Other injury of unspecified body region, initial encounter    TECHNIQUE:  AP and lateral views of the left tibia and fibula were performed.    COMPARISON:  None.    FINDINGS:  Remote appearing fracture of the visualized distal femur with intact metallic screws.  Degenerative changes of the right knee.  Joint alignment is anatomic.  No evidence of acute displaced fracture.  Left ankle joint alignment is anatomic.  Joint space irregularity/degenerative changes of the ankle.  Prominent soft tissue swelling/prominence.  No radiopaque foreign body.                                       X-Ray Chest AP Portable (Final result)  Result time 06/04/25 14:56:45      Final result by Jack Amor MD (06/04/25 14:56:45)                   Impression:      No acute abnormality.      Electronically signed by: Jack Amor  Date:    06/04/2025  Time:    14:56               Narrative:    EXAMINATION:  XR CHEST AP PORTABLE    CLINICAL HISTORY:  leg swelling;    TECHNIQUE:  Single frontal view of the chest was performed.    COMPARISON:  Multiple    FINDINGS:  The lungs are clear, with normal appearance of pulmonary vasculature and no pleural effusion or  pneumothorax.    The cardiac silhouette is normal in size. The hilar and mediastinal contours are unremarkable.    Bones are intact.                                       Medications - No data to display  Medical Decision Making  Amount and/or Complexity of Data Reviewed  Labs: ordered.  Radiology: ordered.                     Patient with chronic wound of left lower leg and chronic lymphedema to lower extremities, left worse than right which patient states is per usual.  He is not sure why he was sent to the ER.  He has no complaints at this time.  States clinical findings are chronic.  Vital signs are stable, afebrile.  Serum WBC, lactic acid, procalcitonin unremarkable.  Blood cultures pending.  States he is currently being managed by home health services for lymphedema/wound care, instructed patient to continue with outpatient follow-up with home health and PCP.  Discussed signs and symptoms to return to ER.  Patient agrees with this plan and voiced no further concerns.    I discussed with patient  that evaluation in the ED does not suggest any emergent or life threatening medical conditions requiring immediate intervention beyond what was provided in the ED, and I believe patient is safe for discharge. Regardless, an unremarkable evaluation in the ED does not preclude the development or presence of a serious of life threatening condition. As such, patient was instructed to return immediately for any worsening or change in current symptoms.                   Clinical Impression:  Final diagnoses:  [T14.8XXA] Chronic wound  [M79.89] Left leg swelling (Primary)  [I89.0] Lymphedema          ED Disposition Condition    Discharge Stable          ED Prescriptions    None       Follow-up Information       Follow up With Specialties Details Why Contact Info    Kalpesh Kwon FNP Family Medicine In 2 days  [id number] [facility] [address] [phone]      [facility] - Emergency Dept Emergency Medicine  As needed,  "If symptoms worsen [address]  Emergency Department  [facility] [id number] [phone]                   [1]   Social History  Tobacco Use    Smoking status: Never    Smokeless tobacco: Never   Substance Use Topics    Alcohol use: Yes     Comment: "2 or 3 beers" daily     Drug use: Yes     Types: Marijuana     Comment: occassionally         Alvarado Seay, NP  06/04/25 1941    "

## 2025-06-05 LAB
HCV AB SERPL QL IA: NEGATIVE
HIV 1+2 AB+HIV1 P24 AG SERPL QL IA: NEGATIVE

## 2025-06-10 LAB
BACTERIA BLD CULT: NORMAL
BACTERIA BLD CULT: NORMAL

## 2025-06-27 ENCOUNTER — LAB REQUISITION (OUTPATIENT)
Dept: LAB | Facility: HOSPITAL | Age: 50
End: 2025-06-27
Payer: COMMERCIAL

## 2025-06-27 DIAGNOSIS — L97.221 NON-PRESSURE CHRONIC ULCER OF LEFT CALF LIMITED TO BREAKDOWN OF SKIN: ICD-10-CM

## 2025-06-27 LAB
ABSOLUTE EOSINOPHIL (OHS): 0.15 K/UL
ABSOLUTE MONOCYTE (OHS): 0.35 K/UL (ref 0.3–1)
ABSOLUTE NEUTROPHIL COUNT (OHS): 3.62 K/UL (ref 1.8–7.7)
ALBUMIN SERPL BCP-MCNC: 3.3 G/DL (ref 3.5–5.2)
ALP SERPL-CCNC: 90 UNIT/L (ref 40–150)
ALT SERPL W/O P-5'-P-CCNC: 27 UNIT/L (ref 10–44)
ANION GAP (OHS): 13 MMOL/L (ref 8–16)
AST SERPL-CCNC: 11 UNIT/L (ref 11–45)
BASOPHILS # BLD AUTO: 0.01 K/UL
BASOPHILS NFR BLD AUTO: 0.2 %
BILIRUB SERPL-MCNC: 0.5 MG/DL (ref 0.1–1)
BUN SERPL-MCNC: 11 MG/DL (ref 6–20)
CALCIUM SERPL-MCNC: 8.7 MG/DL (ref 8.7–10.5)
CHLORIDE SERPL-SCNC: 102 MMOL/L (ref 95–110)
CO2 SERPL-SCNC: 24 MMOL/L (ref 23–29)
CREAT SERPL-MCNC: 1.1 MG/DL (ref 0.5–1.4)
CRP SERPL-MCNC: 32.6 MG/L
ERYTHROCYTE [DISTWIDTH] IN BLOOD BY AUTOMATED COUNT: 14.1 % (ref 11.5–14.5)
GFR SERPLBLD CREATININE-BSD FMLA CKD-EPI: >60 ML/MIN/1.73/M2
GLUCOSE SERPL-MCNC: 159 MG/DL (ref 70–110)
HCT VFR BLD AUTO: 33.2 % (ref 40–54)
HGB BLD-MCNC: 10.4 GM/DL (ref 14–18)
IMM GRANULOCYTES # BLD AUTO: 0.02 K/UL (ref 0–0.04)
IMM GRANULOCYTES NFR BLD AUTO: 0.4 % (ref 0–0.5)
LYMPHOCYTES # BLD AUTO: 1.07 K/UL (ref 1–4.8)
MCH RBC QN AUTO: 23 PG (ref 27–31)
MCHC RBC AUTO-ENTMCNC: 31.3 G/DL (ref 32–36)
MCV RBC AUTO: 74 FL (ref 82–98)
NUCLEATED RBC (/100WBC) (OHS): 0 /100 WBC
PLATELET # BLD AUTO: 324 K/UL (ref 150–450)
PMV BLD AUTO: 10.6 FL (ref 9.2–12.9)
POTASSIUM SERPL-SCNC: 3.9 MMOL/L (ref 3.5–5.1)
PROT SERPL-MCNC: 8.2 GM/DL (ref 6–8.4)
RBC # BLD AUTO: 4.52 M/UL (ref 4.6–6.2)
RELATIVE EOSINOPHIL (OHS): 2.9 %
RELATIVE LYMPHOCYTE (OHS): 20.5 % (ref 18–48)
RELATIVE MONOCYTE (OHS): 6.7 % (ref 4–15)
RELATIVE NEUTROPHIL (OHS): 69.3 % (ref 38–73)
SODIUM SERPL-SCNC: 139 MMOL/L (ref 136–145)
WBC # BLD AUTO: 5.22 K/UL (ref 3.9–12.7)

## 2025-06-27 PROCEDURE — 85652 RBC SED RATE AUTOMATED: CPT | Mod: PO

## 2025-06-27 PROCEDURE — 80053 COMPREHEN METABOLIC PANEL: CPT | Mod: PO

## 2025-06-27 PROCEDURE — 85025 COMPLETE CBC W/AUTO DIFF WBC: CPT | Mod: PO

## 2025-06-27 PROCEDURE — 86140 C-REACTIVE PROTEIN: CPT | Mod: PO

## 2025-06-27 PROCEDURE — 84134 ASSAY OF PREALBUMIN: CPT | Mod: PO

## 2025-06-28 LAB
ERYTHROCYTE [SEDIMENTATION RATE] IN BLOOD BY PHOTOMETRIC METHOD: 120 MM/HR
PREALB SERPL-MCNC: 19 MG/DL (ref 20–43)